# Patient Record
Sex: FEMALE | Race: WHITE | NOT HISPANIC OR LATINO | Employment: FULL TIME | URBAN - METROPOLITAN AREA
[De-identification: names, ages, dates, MRNs, and addresses within clinical notes are randomized per-mention and may not be internally consistent; named-entity substitution may affect disease eponyms.]

---

## 2018-09-07 ENCOUNTER — OFFICE VISIT (OUTPATIENT)
Dept: FAMILY MEDICINE CLINIC | Facility: CLINIC | Age: 38
End: 2018-09-07

## 2018-09-07 VITALS
HEART RATE: 92 BPM | DIASTOLIC BLOOD PRESSURE: 82 MMHG | BODY MASS INDEX: 42.04 KG/M2 | WEIGHT: 261.6 LBS | OXYGEN SATURATION: 98 % | SYSTOLIC BLOOD PRESSURE: 120 MMHG | HEIGHT: 66 IN

## 2018-09-07 DIAGNOSIS — Z30.017 ENCOUNTER FOR INITIAL PRESCRIPTION OF IMPLANTABLE SUBDERMAL CONTRACEPTIVE: ICD-10-CM

## 2018-09-07 DIAGNOSIS — G43.109 MIGRAINE WITH AURA AND WITHOUT STATUS MIGRAINOSUS, NOT INTRACTABLE: ICD-10-CM

## 2018-09-07 DIAGNOSIS — IMO0001 CLASS 3 OBESITY DUE TO EXCESS CALORIES WITH SERIOUS COMORBIDITY AND BODY MASS INDEX (BMI) OF 40.0 TO 44.9 IN ADULT: Primary | ICD-10-CM

## 2018-09-07 PROCEDURE — 99204 OFFICE O/P NEW MOD 45 MIN: CPT | Performed by: FAMILY MEDICINE

## 2018-09-07 RX ORDER — BUTALBITAL, ACETAMINOPHEN AND CAFFEINE 300; 40; 50 MG/1; MG/1; MG/1
CAPSULE ORAL
COMMUNITY
Start: 2018-07-13

## 2018-09-07 RX ORDER — CYCLOBENZAPRINE HCL 10 MG
TABLET ORAL
COMMUNITY
Start: 2018-08-24 | End: 2022-01-31

## 2018-09-07 RX ORDER — MELOXICAM 15 MG/1
1 TABLET ORAL NIGHTLY
COMMUNITY
Start: 2018-08-24 | End: 2021-02-08

## 2018-09-07 RX ORDER — ELETRIPTAN HYDROBROMIDE 40 MG/1
TABLET, FILM COATED ORAL
COMMUNITY
Start: 2018-07-31

## 2018-09-07 RX ORDER — PROPRANOLOL HYDROCHLORIDE 20 MG/1
1 TABLET ORAL DAILY
COMMUNITY
Start: 2018-07-10 | End: 2022-01-31

## 2018-09-07 RX ORDER — OLOPATADINE HYDROCHLORIDE 1 MG/ML
SOLUTION/ DROPS OPHTHALMIC
COMMUNITY
Start: 2018-06-07

## 2018-09-07 RX ORDER — DULOXETIN HYDROCHLORIDE 60 MG/1
1 CAPSULE, DELAYED RELEASE ORAL DAILY
COMMUNITY
Start: 2018-08-19 | End: 2021-02-08

## 2018-09-07 NOTE — PROGRESS NOTES
Melody Aubree Sher presents today to establish care.    Chief Complaint   Patient presents with   • Establish Care        HPI   Obesity:  Highest weight when pregnant 278. Trying to watch portion size. Not able to exercise due to pins in both hips and degenerative disc disease in back. Tried a sample of Saxenda, on and off skipping a day. Lost 5 lbs so far. Previous medications at weight loss center tried water pills, metformin, and phentermine. Headaches got worse.        Migraines:  Followed by neurologist. Propranolol is helping. Cymbalta. Phenergan, relpax, Fioricet PRN. Menstrual and ocular migraines. Worse around periods. Symptoms include nausea, eye throbbing, light sensitivity. Eye strain from working on computer. Sometimes sees little lights and flashes.     Tried a couple different birth control pills. Not planning any future pregnancies. Sexually active, 1 male partner.         Review of Systems   Constitutional: Negative.    HENT: Negative.    Eyes: Negative.    Respiratory: Negative.    Cardiovascular: Negative.    Gastrointestinal: Negative.    Endocrine: Negative.    Genitourinary: Negative.    Musculoskeletal: Positive for arthralgias and back pain.   Skin: Negative.    Allergic/Immunologic: Negative.    Neurological: Positive for headache.   Hematological: Negative.    Psychiatric/Behavioral: The patient is nervous/anxious.         Past Medical History:   Diagnosis Date   • Arthritis    • Lumbar degenerative disc disease    • Migraine with aura    • Seasonal allergies         Past Surgical History:   Procedure Laterality Date   •  SECTION     • EAR TUBES     • EYE ENUCLEATION Left    • HIP SURGERY  ,     pin in both hips         Family History   Problem Relation Age of Onset   • Arthritis Mother    • Diabetes Mother    • Migraines Mother    • Arthritis Maternal Grandmother    • Diabetes Maternal Grandmother    • Hyperlipidemia Maternal Grandmother    • Mental illness  "Maternal Grandfather         Social History     Social History   • Marital status:      Spouse name: N/A   • Number of children: N/A   • Years of education: N/A     Occupational History   • Not on file.     Social History Main Topics   • Smoking status: Former Smoker   • Smokeless tobacco: Never Used   • Alcohol use Yes      Comment: occasionally    • Drug use: No   • Sexual activity: Defer     Other Topics Concern   • Not on file     Social History Narrative   • No narrative on file        Current Outpatient Prescriptions   Medication Sig Dispense Refill   • butalbital-acetaminophen-caffeine (ORBIVAN) -40 MG capsule capsule Take 1 capsule by mouth up to twice daily for migraines     • cyclobenzaprine (FLEXERIL) 10 MG tablet Take 1 tablet by mouth 3 times daily prn.     • DULoxetine (CYMBALTA) 60 MG capsule Take 1 capsule by mouth Daily.     • eletriptan (RELPAX) 40 MG tablet Take 1 tablet by mouth at onset of headache. May repeat in 2 hours if needed     • meloxicam (MOBIC) 15 MG tablet Take 1 tablet by mouth Every Night.     • olopatadine (PATANOL) 0.1 % ophthalmic solution 1 drop in both eyes 2 times daily     • propranolol (INDERAL) 20 MG tablet Take 1 tablet by mouth Daily.     • SPRINTEC 28 0.25-35 MG-MCG per tablet Take 1 tablet by mouth Daily.     • Liraglutide -Weight Management (SAXENDA) 18 MG/3ML solution pen-injector Inject 0.6 mg under the skin into the appropriate area as directed Daily. Increase by 0.6 mg each week to a max dose of 3 mg daily 5 pen 5     No current facility-administered medications for this visit.        Allergies   Allergen Reactions   • Phentermine Other (See Comments)     headaches        Visit Vitals  /82 (BP Location: Left arm, Patient Position: Sitting, Cuff Size: Adult)   Pulse 92   Ht 166.4 cm (65.5\")   Wt 119 kg (261 lb 9.6 oz)   SpO2 98%   BMI 42.87 kg/m²        Physical Exam   Constitutional: She is oriented to person, place, and time. No distress. " "  obese   HENT:   Nose: Nose normal.   Mouth/Throat: Oropharynx is clear and moist.   Eyes: Conjunctivae are normal.   False eye left   Neck: Neck supple. No thyromegaly present.   Cardiovascular: Normal rate, regular rhythm and intact distal pulses.    No murmur heard.  Pulses:       Posterior tibial pulses are 2+ on the right side, and 2+ on the left side.   Pulmonary/Chest: Effort normal and breath sounds normal.   Abdominal: Soft. There is no hepatosplenomegaly. There is no tenderness.   Musculoskeletal: She exhibits no edema.   Lymphadenopathy:     She has no cervical adenopathy.   Neurological: She is alert and oriented to person, place, and time.   Skin: Skin is warm and dry. No rash noted.   Psychiatric: She has a normal mood and affect. Her behavior is normal. Judgment and thought content normal.   Vitals reviewed.       No results found for this or any previous visit.     Melody was seen today for establish care.  Prior PCP records requested.  Prior GYN records requested.  Diagnoses and all orders for this visit:    Class 3 obesity due to excess calories with serious comorbidity and body mass index (BMI) of 40.0 to 44.9 in adult (CMS/[facility])  -     Liraglutide -Weight Management (SAXENDA) 18 MG/3ML solution pen-injector; Inject 0.6 mg under the skin into the appropriate area as directed Daily. Increase by 0.6 mg each week to a max dose of 3 mg daily  Previous medicines tried and failed diuretics, metformin, anterior mean.  She was unable to tolerate phentermine as it made her migraine headaches worse.  She has had some success with samples of 6\".  Prescriptions sent to pharmacy and will PA if needed.  Comorbidities include degenerative disc disease.   Patient's Body mass index is 42.87 kg/m². BMI is above normal parameters. Recommendations include: nutrition counseling and pharmacological intervention.  Encounter for initial prescription of implantable subdermal contraceptive  Discussed with patient having " migraines with aura is a contraindication for combined hormonal contraception such as the Sprintec oral contraceptive pill which she has been taking.  Discussed alternatives including Mirena IUD, Nexplanon, progesterone only pills.  Patient is interested in the Nexplanon device.  Handout provided.  Will contact patient when able to receive device in clinic to schedule appointment for insertion.  At this time she will continue her coral contraceptive pill for birth control.  Migraine with aura and without status migrainosus, not intractable  Patient is under the care of a neurologist.  As discussed above recommend changing oral contraceptive pill due to contraindicated in patients who have migraine with aura.      Return in about 4 weeks (around 10/5/2018) for Follow-up obesity.

## 2018-09-07 NOTE — PATIENT INSTRUCTIONS
Inject 0.6 mg under the skin into the appropriate area as directed Daily. Increase by 0.6 mg each week to a max dose of 3 mg daily    Liraglutide injection (Weight Management)  What is this medicine?  LIRAGLUTIDE (LIR a GLOO tide) is used with a reduced calorie diet and exercise to help you lose weight.  This medicine may be used for other purposes; ask your health care provider or pharmacist if you have questions.  COMMON BRAND NAME(S): Philippe  What should I tell my health care provider before I take this medicine?  They need to know if you have any of these conditions:  -endocrine tumors (MEN 2) or if someone in your family had these tumors  -gallbladder disease  -high cholesterol  -history of alcohol abuse problem  -history of pancreatitis  -kidney disease or if you are on dialysis  -liver disease  -previous swelling of the tongue, face, or lips with difficulty breathing, difficulty swallowing, hoarseness, or tightening of the throat  -stomach problems  -suicidal thoughts, plans, or attempt; a previous suicide attempt by you or a family member  -thyroid cancer or if someone in your family had thyroid cancer  -an unusual or allergic reaction to liraglutide, other medicines, foods, dyes, or preservatives  -pregnant or trying to get pregnant  -breast-feeding  How should I use this medicine?  This medicine is for injection under the skin of your upper leg, stomach area, or upper arm. You will be taught how to prepare and give this medicine. Use exactly as directed. Take your medicine at regular intervals. Do not take it more often than directed.  It is important that you put your used needles and syringes in a special sharps container. Do not put them in a trash can. If you do not have a sharps container, call your pharmacist or healthcare provider to get one.  A special MedGuide will be given to you by the pharmacist with each prescription and refill. Be sure to read this information carefully each time.  Talk to  your pediatrician regarding the use of this medicine in children. Special care may be needed.  Overdosage: If you think you have taken too much of this medicine contact a poison control center or emergency room at once.  NOTE: This medicine is only for you. Do not share this medicine with others.  What if I miss a dose?  If you miss a dose, take it as soon as you can. If it is almost time for your next dose, take only that dose. Do not take double or extra doses. If you miss your dose for 3 days or more, call your doctor or health care professional to talk about how to restart this medicine.  What may interact with this medicine?  -insulin and other medicines for diabetes  This list may not describe all possible interactions. Give your health care provider a list of all the medicines, herbs, non-prescription drugs, or dietary supplements you use. Also tell them if you smoke, drink alcohol, or use illegal drugs. Some items may interact with your medicine.  What should I watch for while using this medicine?  Visit your doctor or health care professional for regular checks on your progress. This medicine is intended to be used in addition to a healthy diet and appropriate exercise. The best results are achieved this way. Do not increase or in any way change your dose without consulting your doctor or health care professional.  Drink plenty of fluids while taking this medicine. Check with your doctor or health care professional if you get an attack of severe diarrhea, nausea, and vomiting. The loss of too much body fluid can make it dangerous for you to take this medicine.  This medicine may affect blood sugar levels. If you have diabetes, check with your doctor or health care professional before you change your diet or the dose of your diabetic medicine.  Patients and their families should watch out for worsening depression or thoughts of suicide. Also watch out for sudden changes in feelings such as feeling anxious,  agitated, panicky, irritable, hostile, aggressive, impulsive, severely restless, overly excited and hyperactive, or not being able to sleep. If this happens, especially at the beginning of treatment or after a change in dose, call your health care professional.  What side effects may I notice from receiving this medicine?  Side effects that you should report to your doctor or health care professional as soon as possible:  -allergic reactions like skin rash, itching or hives, swelling of the face, lips, or tongue  -breathing problems  -diarrhea that continues or is severe  -lump or swelling on the neck  -severe nausea  -signs and symptoms of infection like fever or chills; cough; sore throat; pain or trouble passing urine  -signs and symptoms of low blood sugar such as feeling anxious, confusion, dizziness, increased hunger, unusually weak or tired, sweating, shakiness, cold, irritable, headache, blurred vision, fast heartbeat, loss of consciousness  -signs and symptoms of kidney injury like trouble passing urine or change in the amount of urine  -trouble swallowing  -unusual stomach upset or pain  -vomiting  Side effects that usually do not require medical attention (report to your doctor or health care professional if they continue or are bothersome):  -constipation  -decreased appetite  -diarrhea  -fatigue  -headache  -nausea  -pain, redness, or irritation at site where injected  -stomach upset  -stuffy or runny nose  This list may not describe all possible side effects. Call your doctor for medical advice about side effects. You may report side effects to FDA at 6-945-FDA-1559.  Where should I keep my medicine?  Keep out of the reach of children.  Store unopened pen in a refrigerator between 2 and 8 degrees C (36 and 46 degrees F). Do not freeze or use if the medicine has been frozen. Protect from light and excessive heat. After you first use the pen, it can be stored at room temperature between 15 and 30 degrees  C (59 and 86 degrees F) or in a refrigerator. Throw away your used pen after 30 days or after the expiration date, whichever comes first.  Do not store your pen with the needle attached. If the needle is left on, medicine may leak from the pen.  NOTE: This sheet is a summary. It may not cover all possible information. If you have questions about this medicine, talk to your doctor, pharmacist, or health care provider.  © 2018 [employer] (2018-01-04 14:41:37)

## 2018-09-13 ENCOUNTER — TELEPHONE (OUTPATIENT)
Dept: FAMILY MEDICINE CLINIC | Facility: CLINIC | Age: 38
End: 2018-09-13

## 2018-09-13 NOTE — TELEPHONE ENCOUNTER
GEGE FROM [employer] CALLED AND WOULD LIKE TO SPEAK TO EITHER PCP OR MA ABOUT A ORDER THEY RECEIVED FOR PT.    PLEASE CALL GEGE BACK AT   [phone] EXT [phone]

## 2018-10-05 ENCOUNTER — PROCEDURE VISIT (OUTPATIENT)
Dept: FAMILY MEDICINE CLINIC | Facility: CLINIC | Age: 38
End: 2018-10-05

## 2018-10-05 VITALS
SYSTOLIC BLOOD PRESSURE: 132 MMHG | BODY MASS INDEX: 41.62 KG/M2 | HEIGHT: 66 IN | WEIGHT: 259 LBS | HEART RATE: 103 BPM | OXYGEN SATURATION: 97 % | DIASTOLIC BLOOD PRESSURE: 78 MMHG

## 2018-10-05 DIAGNOSIS — Z30.017 NEXPLANON INSERTION: Primary | ICD-10-CM

## 2018-10-05 LAB
B-HCG UR QL: NEGATIVE
INTERNAL NEGATIVE CONTROL: NEGATIVE
INTERNAL POSITIVE CONTROL: POSITIVE
Lab: NORMAL

## 2018-10-05 PROCEDURE — 11981 INSERTION DRUG DLVR IMPLANT: CPT | Performed by: FAMILY MEDICINE

## 2018-10-05 PROCEDURE — 81025 URINE PREGNANCY TEST: CPT | Performed by: FAMILY MEDICINE

## 2018-10-05 NOTE — PROGRESS NOTES
Contraception:  Melody Sher presents today for contraception    Chief Complaint   Patient presents with   • Contraception     Naxplanon insertion        HPI     Current method:  [x]   Combined hormonal pill  []  Combined vaginal ring  []   Combined transdermal patch []   Progestin-only pill  []   Depo-Provera injectable  []   Nexplanon implant  []   Cu-IUD    []   LNG-IUD    LMP 9/4/18. Last active pill tomorrow before starting placebos.         Review of Systems   Genitourinary: Negative for menstrual problem.        Menstrual History         OB History     No data available           Past Medical History:   Diagnosis Date   • Anxiety    • Arthritis    • Depression    • Hyperlipidemia    • Lumbar degenerative disc disease    • Migraine with aura    • Seasonal allergies         Family History   Problem Relation Age of Onset   • Arthritis Mother    • Diabetes Mother    • Migraines Mother    • Arthritis Maternal Grandmother    • Diabetes Maternal Grandmother    • Hyperlipidemia Maternal Grandmother    • Mental illness Maternal Grandfather         Social History     Social History   • Marital status:      Spouse name: N/A   • Number of children: N/A   • Years of education: N/A     Occupational History   • Not on file.     Social History Main Topics   • Smoking status: Former Smoker   • Smokeless tobacco: Never Used   • Alcohol use Yes      Comment: occasionally    • Drug use: No   • Sexual activity: Defer     Other Topics Concern   • Not on file     Social History Narrative   • No narrative on file       Current Outpatient Prescriptions   Medication Sig Dispense Refill   • butalbital-acetaminophen-caffeine (ORBIVAN) -40 MG capsule capsule Take 1 capsule by mouth up to twice daily for migraines     • cyclobenzaprine (FLEXERIL) 10 MG tablet Take 1 tablet by mouth 3 times daily prn.     • DULoxetine (CYMBALTA) 60 MG capsule Take 1 capsule by mouth Daily.     • eletriptan (RELPAX) 40 MG tablet Take 1  tablet by mouth at onset of headache. May repeat in 2 hours if needed     • Liraglutide -Weight Management (SAXENDA) 18 MG/3ML solution pen-injector Inject 0.6 mg under the skin into the appropriate area as directed Daily. Increase by 0.6 mg each week to a max dose of 3 mg daily 5 pen 5   • meloxicam (MOBIC) 15 MG tablet Take 1 tablet by mouth Every Night.     • olopatadine (PATANOL) 0.1 % ophthalmic solution 1 drop in both eyes 2 times daily     • propranolol (INDERAL) 20 MG tablet Take 1 tablet by mouth Daily.     • SPRINTEC 28 0.25-35 MG-MCG per tablet Take 1 tablet by mouth Daily.       No current facility-administered medications for this visit.         Allergies   Allergen Reactions   • Phentermine Other (See Comments)     headaches         Physical Exam   Constitutional: She appears well-developed. No distress.   Skin:   Normal inspection and palpation left upper arm   Psychiatric: She has a normal mood and affect. Her behavior is normal. Judgment and thought content normal.      Informed consent obtained.     Site marked 8-10 cm from the medial epicondyle of the humerus of patient's preferred arm. Area prepped and draped with sterile technique. Lidocaine 1% with epinephrine 5ml injected to area. After testing for adequate anesthesia, Nexplanon device inserted in usual fashion. Nexplanon palpated subdermally. Steri-strips and pressure dressing applied. Patient tolerated procedure well.   Results for orders placed or performed in visit on 10/05/18   POCT pregnancy, urine   Result Value Ref Range    HCG, Urine, QL Negative Negative    Lot Number [id number]     Internal Positive Control Positive     Internal Negative Control Negative          Melody was seen today for contraception.    Diagnoses and all orders for this visit:    Nexplanon insertion  -     POCT pregnancy, urine  -     Etonogestrel (NEXPLANON) 68 MG subdermal implant; Inject  into the appropriate area of the skin as directed by provider 1 (One)  Time.     Successful insertion of Nexplanon left arm. She needs to take final active pill of OCPs tomorrow.     Return in about 4 weeks (around 11/2/2018) for Follow-up.

## 2019-01-14 ENCOUNTER — TELEPHONE (OUTPATIENT)
Dept: FAMILY MEDICINE CLINIC | Facility: CLINIC | Age: 39
End: 2019-01-14

## 2019-01-14 NOTE — TELEPHONE ENCOUNTER
Since early November, she has had moderate / heavy menses for 10-14 days at a time.     She has an appointment with you on 2/8/19.     Is there anything she can do until then?

## 2019-01-14 NOTE — TELEPHONE ENCOUNTER
The change in menstrual cycle is likely due to the Nexplanon implant.  If she is able to come in sooner we can discuss treatment options at that time.

## 2019-02-08 ENCOUNTER — OFFICE VISIT (OUTPATIENT)
Dept: FAMILY MEDICINE CLINIC | Facility: CLINIC | Age: 39
End: 2019-02-08

## 2019-02-08 VITALS
WEIGHT: 266 LBS | HEART RATE: 77 BPM | SYSTOLIC BLOOD PRESSURE: 122 MMHG | DIASTOLIC BLOOD PRESSURE: 78 MMHG | BODY MASS INDEX: 42.75 KG/M2 | HEIGHT: 66 IN | OXYGEN SATURATION: 98 %

## 2019-02-08 DIAGNOSIS — Z30.46 ENCOUNTER FOR SURVEILLANCE OF IMPLANTABLE SUBDERMAL CONTRACEPTIVE: Primary | ICD-10-CM

## 2019-02-08 PROCEDURE — 99213 OFFICE O/P EST LOW 20 MIN: CPT | Performed by: FAMILY MEDICINE

## 2019-02-08 RX ORDER — DICLOFENAC POTASSIUM 50 MG/1
50 TABLET, FILM COATED ORAL 2 TIMES DAILY
COMMUNITY
End: 2022-01-31

## 2019-02-08 NOTE — PROGRESS NOTES
Chief Complaint   Patient presents with   • Contraception     discuss implant        HPI   Contraception:  Nexplanon inserted in clinic October 5, 2018. Current menses for 2 weeks, spotting controlled with liners.  She is interested in using Nexplanon temporarily while her partner is planning to get a vasectomy    Obesity:  Saxenda was denied. She has been on topamax but lost eye sight. Phentermine caused headaches.  Having a hard time loosing weight. Decreasing portion sizes and weight watchers points. Walking for physical activity on treadmill.     Review of Systems   Constitutional: Positive for unexpected weight gain.   Genitourinary: Positive for menstrual problem.   Musculoskeletal: Positive for arthralgias and back pain.        Current Outpatient Medications on File Prior to Visit   Medication Sig Dispense Refill   • butalbital-acetaminophen-caffeine (ORBIVAN) -40 MG capsule capsule Take 1 capsule by mouth up to twice daily for migraines     • cyclobenzaprine (FLEXERIL) 10 MG tablet Take 1 tablet by mouth 3 times daily prn.     • diclofenac (CATAFLAM) 50 MG tablet Take 50 mg by mouth 2 (Two) Times a Day.     • DULoxetine (CYMBALTA) 60 MG capsule Take 1 capsule by mouth Daily.     • eletriptan (RELPAX) 40 MG tablet Take 1 tablet by mouth at onset of headache. May repeat in 2 hours if needed     • Etonogestrel (NEXPLANON) 68 MG implant subdermal implant Inject 1 each into the appropriate area of the skin as directed by provider 1 (One) Time.     • meloxicam (MOBIC) 15 MG tablet Take 1 tablet by mouth Every Night.     • olopatadine (PATANOL) 0.1 % ophthalmic solution 1 drop in both eyes 2 times daily     • propranolol (INDERAL) 20 MG tablet Take 1 tablet by mouth Daily.     • [DISCONTINUED] Liraglutide -Weight Management (SAXENDA) 18 MG/3ML solution pen-injector Inject 0.6 mg under the skin into the appropriate area as directed Daily. Increase by 0.6 mg each week to a max dose of 3 mg daily 5 pen 5     No  "current facility-administered medications on file prior to visit.        Allergies   Allergen Reactions   • Phentermine Other (See Comments)     headaches   • Topamax [Topiramate] Other (See Comments)     Vision change       Past Medical History:   Diagnosis Date   • Anxiety    • Arthritis    • Depression    • Hyperlipidemia    • Lumbar degenerative disc disease    • Migraine with aura    • Seasonal allergies         Past Surgical History:   Procedure Laterality Date   •  SECTION  2013   • EAR TUBES     • EYE ENUCLEATION Left    • HIP SURGERY  ,     pin in both hips         Family History   Problem Relation Age of Onset   • Arthritis Mother    • Diabetes Mother    • Migraines Mother    • Arthritis Maternal Grandmother    • Diabetes Maternal Grandmother    • Hyperlipidemia Maternal Grandmother    • Mental illness Maternal Grandfather         Social History     Socioeconomic History   • Marital status:      Spouse name: Not on file   • Number of children: Not on file   • Years of education: Not on file   • Highest education level: Not on file   Social Needs   • Financial resource strain: Not on file   • Food insecurity - worry: Not on file   • Food insecurity - inability: Not on file   • Transportation needs - medical: Not on file   • Transportation needs - non-medical: Not on file   Occupational History   • Not on file   Tobacco Use   • Smoking status: Former Smoker   • Smokeless tobacco: Never Used   Substance and Sexual Activity   • Alcohol use: Yes     Comment: occasionally    • Drug use: No   • Sexual activity: Defer   Other Topics Concern   • Not on file   Social History Narrative   • Not on file        Visit Vitals  /78 (BP Location: Left arm, Patient Position: Sitting, Cuff Size: Adult)   Pulse 77   Ht 166.4 cm (65.5\")   Wt 121 kg (266 lb)   SpO2 98%   BMI 43.59 kg/m²        Physical Exam   Constitutional: No distress. She is obese.  Cardiovascular: Normal rate and " regular rhythm.   No murmur heard.  Pulmonary/Chest: Effort normal and breath sounds normal.   Skin:   Nexplanon implant palpable left upper extremity   Psychiatric: She has a normal mood and affect.   Vitals reviewed.           Melody was seen today for contraception.    Diagnoses and all orders for this visit:    Encounter for surveillance of implantable subdermal contraceptive  Discussed with patient abnormal bleeding patterns are typical with Nexplanon implant. At this time she is not experiencing any heavy menses.  She is hoping her partner will get a vasectomy soon and then will have plans to remove Nexplanon implant  BMI 40.0-44.9, adult (CMS/[facility])  -     Liraglutide -Weight Management (SAXENDA) 18 MG/3ML solution pen-injector; Inject 0.6 mg under the skin into the appropriate area as directed Daily. Increase by 0.6 mg each week to a max dose of 3 mg daily  Worse. Start PA for Saxenda.  Previous weight loss medications included Topamax and phentermine both of which she had side effects to.  She is currently involved in a diet and exercise plan.  Start ezio over the counter while awaiting prior approval for Saxenda.  Comorbidities include back and joint pain.  Patient's Body mass index is 43.59 kg/m². BMI is above normal parameters. Recommendations include: exercise counseling, nutrition counseling and pharmacological intervention.  Once she has started Saxenda plan to schedule follow-up in one month.        Return if symptoms worsen or fail to improve.

## 2019-07-23 ENCOUNTER — LAB (OUTPATIENT)
Dept: LAB | Facility: HOSPITAL | Age: 39
End: 2019-07-23

## 2019-07-23 ENCOUNTER — OFFICE VISIT (OUTPATIENT)
Dept: FAMILY MEDICINE CLINIC | Facility: CLINIC | Age: 39
End: 2019-07-23

## 2019-07-23 VITALS
WEIGHT: 260 LBS | OXYGEN SATURATION: 97 % | DIASTOLIC BLOOD PRESSURE: 84 MMHG | HEIGHT: 66 IN | SYSTOLIC BLOOD PRESSURE: 122 MMHG | BODY MASS INDEX: 41.78 KG/M2 | HEART RATE: 124 BPM

## 2019-07-23 DIAGNOSIS — Z30.8 ENCOUNTER FOR OTHER CONTRACEPTIVE MANAGEMENT: ICD-10-CM

## 2019-07-23 DIAGNOSIS — N92.6 IRREGULAR MENSES: ICD-10-CM

## 2019-07-23 DIAGNOSIS — R53.82 CHRONIC FATIGUE: ICD-10-CM

## 2019-07-23 DIAGNOSIS — R53.82 CHRONIC FATIGUE: Primary | ICD-10-CM

## 2019-07-23 PROCEDURE — 84443 ASSAY THYROID STIM HORMONE: CPT | Performed by: FAMILY MEDICINE

## 2019-07-23 PROCEDURE — 85027 COMPLETE CBC AUTOMATED: CPT | Performed by: FAMILY MEDICINE

## 2019-07-23 PROCEDURE — 84466 ASSAY OF TRANSFERRIN: CPT | Performed by: FAMILY MEDICINE

## 2019-07-23 PROCEDURE — 83540 ASSAY OF IRON: CPT | Performed by: FAMILY MEDICINE

## 2019-07-23 PROCEDURE — 82607 VITAMIN B-12: CPT | Performed by: FAMILY MEDICINE

## 2019-07-23 PROCEDURE — 99213 OFFICE O/P EST LOW 20 MIN: CPT | Performed by: FAMILY MEDICINE

## 2019-07-23 RX ORDER — MEDROXYPROGESTERONE ACETATE 150 MG/ML
150 INJECTION, SUSPENSION INTRAMUSCULAR
Qty: 1 EACH | Refills: 2 | Status: SHIPPED | OUTPATIENT
Start: 2019-07-23 | End: 2021-02-08

## 2019-07-23 NOTE — PROGRESS NOTES
Chief Complaint   Patient presents with   • Contraception     pt would like to discuss removal of birth control implant, has been bleeding for 3 weeks now and has had a headache for 8 days. Pt also states that she has no energy.        HPI   Contraception:  Nexplanon placed last year and she is had side effects since starting the medication.  Headache for 8 days. Now on period for 3 weeks, currently spotting. No energy, feels need to sleep. Started taking b12.   Partner planning vasectomy.  She needs continue contraception until a vasectomy is complete.          Review of Systems   Constitutional: Positive for fatigue.   Genitourinary: Positive for menstrual problem.   Neurological: Positive for headache.        Current Outpatient Medications on File Prior to Visit   Medication Sig Dispense Refill   • butalbital-acetaminophen-caffeine (ORBIVAN) -40 MG capsule capsule Take 1 capsule by mouth up to twice daily for migraines     • cyclobenzaprine (FLEXERIL) 10 MG tablet Take 1 tablet by mouth 3 times daily prn.     • diclofenac (CATAFLAM) 50 MG tablet Take 50 mg by mouth 2 (Two) Times a Day.     • DULoxetine (CYMBALTA) 60 MG capsule Take 1 capsule by mouth Daily.     • eletriptan (RELPAX) 40 MG tablet Take 1 tablet by mouth at onset of headache. May repeat in 2 hours if needed     • Etonogestrel (NEXPLANON) 68 MG implant subdermal implant Inject 1 each into the appropriate area of the skin as directed by provider 1 (One) Time.     • meloxicam (MOBIC) 15 MG tablet Take 1 tablet by mouth Every Night.     • olopatadine (PATANOL) 0.1 % ophthalmic solution 1 drop in both eyes 2 times daily     • propranolol (INDERAL) 20 MG tablet Take 1 tablet by mouth Daily.     • Liraglutide -Weight Management (SAXENDA) 18 MG/3ML solution pen-injector Inject 0.6 mg under the skin into the appropriate area as directed Daily. Increase by 0.6 mg each week to a max dose of 3 mg daily 5 pen 5     No current facility-administered  "medications on file prior to visit.        Allergies   Allergen Reactions   • Phentermine Other (See Comments)     headaches   • Topamax [Topiramate] Other (See Comments)     Vision change       Past Medical History:   Diagnosis Date   • Anxiety    • Arthritis    • Depression    • Hyperlipidemia    • Lumbar degenerative disc disease    • Migraine with aura    • Seasonal allergies         Past Surgical History:   Procedure Laterality Date   •  SECTION  2013   • EAR TUBES     • EYE ENUCLEATION Left    • HIP SURGERY  ,     pin in both hips         Family History   Problem Relation Age of Onset   • Arthritis Mother    • Diabetes Mother    • Migraines Mother    • Arthritis Maternal Grandmother    • Diabetes Maternal Grandmother    • Hyperlipidemia Maternal Grandmother    • Mental illness Maternal Grandfather         Social History     Socioeconomic History   • Marital status:      Spouse name: Not on file   • Number of children: Not on file   • Years of education: Not on file   • Highest education level: Not on file   Tobacco Use   • Smoking status: Former Smoker   • Smokeless tobacco: Never Used   Substance and Sexual Activity   • Alcohol use: Yes     Comment: occasionally    • Drug use: No   • Sexual activity: Defer        Visit Vitals  /84 (BP Location: Left arm, Patient Position: Sitting, Cuff Size: Large Adult)   Pulse (!) 124   Ht 166.4 cm (65.5\")   Wt 118 kg (260 lb)   SpO2 97%   BMI 42.61 kg/m²        Physical Exam   Constitutional: No distress. She is obese.  Cardiovascular: Normal rate and regular rhythm.   No murmur heard.  Pulmonary/Chest: Effort normal and breath sounds normal.   Skin:     Nonpalpable left upper extremity   Psychiatric: She has a normal mood and affect.   Vitals reviewed.           Melody was seen today for contraception.    Diagnoses and all orders for this visit:    Chronic fatigue  -     Vitamin B12; Future  -     CBC (No Diff); Future  -     TSH " Rfx On Abnormal To Free T4; Future  -     Iron Profile; Future  New. Further evaluation with labs today.  Irregular menses  -     CBC (No Diff); Future  -     TSH Rfx On Abnormal To Free T4; Future  -     Iron Profile; Future  -     medroxyPROGESTERone (DEPO-PROVERA) 150 MG/ML injection; Inject 1 mL into the appropriate muscle as directed by prescriber Every 3 (Three) Months.  Previously discussed with patient regular menses can be a side effect of the Nexplanon.  She wishes to discontinue Nexplanon.  She will be in need of contraception.  At this time transition to Depo-Provera injection at 11 to 13-week intervals.  Recommend having Nexplanon removed at least 7 days after Depo-Provera injection to ensure contraceptive coverage.   Encounter for other contraceptive management  -     medroxyPROGESTERone (DEPO-PROVERA) 150 MG/ML injection; Inject 1 mL into the appropriate muscle as directed by prescriber Every 3 (Three) Months.   At this time transition to Depo-Provera injection at 11 to 13-week intervals.  Recommend having Nexplanon removed at least 7 days after Depo-Provera injection to ensure contraceptive coverage.       Return in about 1 week (around 7/30/2019) for Procedure Nexplanon removal.

## 2019-07-24 LAB
DEPRECATED RDW RBC AUTO: 45 FL (ref 37–54)
ERYTHROCYTE [DISTWIDTH] IN BLOOD BY AUTOMATED COUNT: 13.1 % (ref 12.3–15.4)
HCT VFR BLD AUTO: 48.5 % (ref 34–46.6)
HGB BLD-MCNC: 15.2 G/DL (ref 12–15.9)
IRON 24H UR-MRATE: 149 MCG/DL (ref 37–145)
IRON SATN MFR SERPL: 28 % (ref 20–50)
MCH RBC QN AUTO: 29.1 PG (ref 26.6–33)
MCHC RBC AUTO-ENTMCNC: 31.3 G/DL (ref 31.5–35.7)
MCV RBC AUTO: 92.7 FL (ref 79–97)
PLATELET # BLD AUTO: 331 10*3/MM3 (ref 140–450)
PMV BLD AUTO: 10.2 FL (ref 6–12)
RBC # BLD AUTO: 5.23 10*6/MM3 (ref 3.77–5.28)
TIBC SERPL-MCNC: 532 MCG/DL (ref 298–536)
TRANSFERRIN SERPL-MCNC: 357 MG/DL (ref 200–360)
TSH SERPL DL<=0.05 MIU/L-ACNC: 0.72 MIU/ML (ref 0.27–4.2)
VIT B12 BLD-MCNC: 222 PG/ML (ref 211–946)
WBC NRBC COR # BLD: 8.58 10*3/MM3 (ref 3.4–10.8)

## 2019-07-24 RX ADMIN — MEDROXYPROGESTERONE ACETATE 150 MG: 150 INJECTION, SUSPENSION INTRAMUSCULAR at 08:58

## 2019-07-25 ENCOUNTER — CLINICAL SUPPORT NO REQUIREMENTS (OUTPATIENT)
Dept: FAMILY MEDICINE CLINIC | Facility: CLINIC | Age: 39
End: 2019-07-25

## 2019-07-25 DIAGNOSIS — Z30.42 ENCOUNTER FOR DEPO-PROVERA CONTRACEPTION: Primary | ICD-10-CM

## 2019-07-25 PROCEDURE — 96372 THER/PROPH/DIAG INJ SC/IM: CPT | Performed by: FAMILY MEDICINE

## 2019-07-25 RX ORDER — MEDROXYPROGESTERONE ACETATE 150 MG/ML
150 INJECTION, SUSPENSION INTRAMUSCULAR ONCE
Status: COMPLETED | OUTPATIENT
Start: 2019-07-24 | End: 2019-07-24

## 2019-07-31 ENCOUNTER — PROCEDURE VISIT (OUTPATIENT)
Dept: FAMILY MEDICINE CLINIC | Facility: CLINIC | Age: 39
End: 2019-07-31

## 2019-07-31 VITALS
DIASTOLIC BLOOD PRESSURE: 82 MMHG | HEART RATE: 106 BPM | SYSTOLIC BLOOD PRESSURE: 128 MMHG | OXYGEN SATURATION: 98 % | BODY MASS INDEX: 42.11 KG/M2 | WEIGHT: 262 LBS | HEIGHT: 66 IN

## 2019-07-31 DIAGNOSIS — Z30.46 NEXPLANON REMOVAL: Primary | ICD-10-CM

## 2019-07-31 PROCEDURE — 11982 REMOVE DRUG IMPLANT DEVICE: CPT | Performed by: FAMILY MEDICINE

## 2019-07-31 RX ORDER — LIDOCAINE HYDROCHLORIDE 20 MG/ML
5 INJECTION, SOLUTION INFILTRATION; PERINEURAL ONCE
Status: COMPLETED | OUTPATIENT
Start: 2019-07-31 | End: 2019-07-31

## 2019-07-31 RX ADMIN — LIDOCAINE HYDROCHLORIDE 5 ML: 20 INJECTION, SOLUTION INFILTRATION; PERINEURAL at 16:00

## 2019-07-31 NOTE — PROGRESS NOTES
Chief Complaint   Patient presents with   • Contraception     Nexplanon removal        HPI   Nexplanon removal:  Switching from Nexplanon to Depo-Provera while awaiting partner's vasectomy.  Patient received Depo-Provera injection last week.  Discussed risks and benefits of procedure.  Written consent obtained.      Review of Systems   Genitourinary: Positive for menstrual problem.        Current Outpatient Medications on File Prior to Visit   Medication Sig Dispense Refill   • butalbital-acetaminophen-caffeine (ORBIVAN) -40 MG capsule capsule Take 1 capsule by mouth up to twice daily for migraines     • cyclobenzaprine (FLEXERIL) 10 MG tablet Take 1 tablet by mouth 3 times daily prn.     • diclofenac (CATAFLAM) 50 MG tablet Take 50 mg by mouth 2 (Two) Times a Day.     • DULoxetine (CYMBALTA) 60 MG capsule Take 1 capsule by mouth Daily.     • eletriptan (RELPAX) 40 MG tablet Take 1 tablet by mouth at onset of headache. May repeat in 2 hours if needed     • Liraglutide -Weight Management (SAXENDA) 18 MG/3ML solution pen-injector Inject 0.6 mg under the skin into the appropriate area as directed Daily. Increase by 0.6 mg each week to a max dose of 3 mg daily 5 pen 5   • medroxyPROGESTERone (DEPO-PROVERA) 150 MG/ML injection Inject 1 mL into the appropriate muscle as directed by prescriber Every 3 (Three) Months. 1 each 2   • meloxicam (MOBIC) 15 MG tablet Take 1 tablet by mouth Every Night.     • olopatadine (PATANOL) 0.1 % ophthalmic solution 1 drop in both eyes 2 times daily     • propranolol (INDERAL) 20 MG tablet Take 1 tablet by mouth Daily.     • [DISCONTINUED] Etonogestrel (NEXPLANON) 68 MG implant subdermal implant Inject 1 each into the appropriate area of the skin as directed by provider 1 (One) Time.       No current facility-administered medications on file prior to visit.         Allergies   Allergen Reactions   • Phentermine Other (See Comments)     headaches   • Topamax [Topiramate] Other (See  "Comments)     Vision change       Past Medical History:   Diagnosis Date   • Anxiety    • Arthritis    • Depression    • Hyperlipidemia    • Lumbar degenerative disc disease    • Migraine with aura    • Seasonal allergies        Social History     Tobacco Use   Smoking Status Former Smoker   Smokeless Tobacco Never Used        Visit Vitals  /82   Pulse 106   Ht 166.4 cm (65.51\")   Wt 119 kg (262 lb)   SpO2 98%   BMI 42.92 kg/m²        Physical Exam   Skin:   Nexplanon palpable left upper arm     Informed consent obtained.     Nexplanon palpated in arm. Skin marker to identify border of device. Site prepped and draped in with sterile technique. Lidocaine 1% with epinephrine 5 ml injected to area. After testing for adequate anesthesia, no. 15 blade scalpel used to make small incision over distal end of Nexplanon implant. Hemostat used to grasp edge of Nexplanon and removed. Nexplanon observed to be intact after removal. Steri-strips and pressure dressing applied.     Administrations This Visit     lidocaine (XYLOCAINE) 2% injection 5 mL     Admin Date  07/31/2019 Action  Given Dose  5 mL Route  Infiltration Administered By  Adeola Ramsey MD Adina was seen today for contraception.    Diagnoses and all orders for this visit:    Nexplanon removal  -     lidocaine (XYLOCAINE) 2% injection 5 mL    Successful removal.  Advised to keep Steri-Strip in place for 48 hours.      "

## 2020-04-15 DIAGNOSIS — Z30.8 ENCOUNTER FOR OTHER CONTRACEPTIVE MANAGEMENT: ICD-10-CM

## 2020-04-15 DIAGNOSIS — N92.6 IRREGULAR MENSES: ICD-10-CM

## 2020-04-15 RX ORDER — MEDROXYPROGESTERONE ACETATE 150 MG/ML
INJECTION, SUSPENSION INTRAMUSCULAR
Qty: 1 EACH | Refills: 1 | OUTPATIENT
Start: 2020-04-15

## 2020-08-05 ENCOUNTER — OFFICE VISIT (OUTPATIENT)
Dept: FAMILY MEDICINE CLINIC | Facility: CLINIC | Age: 40
End: 2020-08-05

## 2020-08-05 ENCOUNTER — TELEPHONE (OUTPATIENT)
Dept: FAMILY MEDICINE CLINIC | Facility: CLINIC | Age: 40
End: 2020-08-05

## 2020-08-05 ENCOUNTER — LAB (OUTPATIENT)
Dept: LAB | Facility: HOSPITAL | Age: 40
End: 2020-08-05

## 2020-08-05 ENCOUNTER — HOSPITAL ENCOUNTER (OUTPATIENT)
Dept: GENERAL RADIOLOGY | Facility: HOSPITAL | Age: 40
Discharge: HOME | End: 2020-08-05
Admitting: PHYSICIAN ASSISTANT

## 2020-08-05 VITALS
BODY MASS INDEX: 44.36 KG/M2 | SYSTOLIC BLOOD PRESSURE: 110 MMHG | HEIGHT: 66 IN | HEART RATE: 119 BPM | DIASTOLIC BLOOD PRESSURE: 90 MMHG | WEIGHT: 276 LBS | OXYGEN SATURATION: 99 %

## 2020-08-05 DIAGNOSIS — Z01.818 PRE-OP EXAMINATION: ICD-10-CM

## 2020-08-05 DIAGNOSIS — Z11.59 ENCOUNTER FOR HEPATITIS C SCREENING TEST FOR LOW RISK PATIENT: ICD-10-CM

## 2020-08-05 DIAGNOSIS — R82.90 ABNORMAL URINE: ICD-10-CM

## 2020-08-05 DIAGNOSIS — Z01.818 PRE-OP EXAMINATION: Primary | ICD-10-CM

## 2020-08-05 DIAGNOSIS — Z12.31 ENCOUNTER FOR SCREENING MAMMOGRAM FOR MALIGNANT NEOPLASM OF BREAST: ICD-10-CM

## 2020-08-05 DIAGNOSIS — Z13.29 SCREENING FOR THYROID DISORDER: ICD-10-CM

## 2020-08-05 DIAGNOSIS — Z13.220 SCREENING FOR CHOLESTEROL LEVEL: ICD-10-CM

## 2020-08-05 DIAGNOSIS — R00.0 TACHYCARDIA: ICD-10-CM

## 2020-08-05 LAB
ALBUMIN SERPL-MCNC: 4.5 G/DL (ref 3.5–5.2)
ALBUMIN/GLOB SERPL: 1.5 G/DL
ALP SERPL-CCNC: 68 U/L (ref 39–117)
ALT SERPL W P-5'-P-CCNC: 11 U/L (ref 1–33)
ANION GAP SERPL CALCULATED.3IONS-SCNC: 15 MMOL/L (ref 5–15)
APTT PPP: 27.1 SECONDS (ref 24–37)
AST SERPL-CCNC: 18 U/L (ref 1–32)
BASOPHILS # BLD AUTO: 0.07 10*3/MM3 (ref 0–0.2)
BASOPHILS NFR BLD AUTO: 0.7 % (ref 0–1.5)
BILIRUB BLD-MCNC: ABNORMAL MG/DL
BILIRUB SERPL-MCNC: 0.2 MG/DL (ref 0–1.2)
BUN SERPL-MCNC: 14 MG/DL (ref 6–20)
BUN/CREAT SERPL: 15.1 (ref 7–25)
CALCIUM SPEC-SCNC: 9.7 MG/DL (ref 8.6–10.5)
CHLORIDE SERPL-SCNC: 103 MMOL/L (ref 98–107)
CHOLEST SERPL-MCNC: 237 MG/DL (ref 0–200)
CLARITY, POC: CLEAR
CO2 SERPL-SCNC: 19 MMOL/L (ref 22–29)
COLOR UR: ABNORMAL
CREAT SERPL-MCNC: 0.93 MG/DL (ref 0.57–1)
DEPRECATED RDW RBC AUTO: 43.4 FL (ref 37–54)
EOSINOPHIL # BLD AUTO: 0.41 10*3/MM3 (ref 0–0.4)
EOSINOPHIL NFR BLD AUTO: 3.8 % (ref 0.3–6.2)
ERYTHROCYTE [DISTWIDTH] IN BLOOD BY AUTOMATED COUNT: 12.7 % (ref 12.3–15.4)
GFR SERPL CREATININE-BSD FRML MDRD: 66 ML/MIN/1.73
GLOBULIN UR ELPH-MCNC: 3 GM/DL
GLUCOSE SERPL-MCNC: 111 MG/DL (ref 65–99)
GLUCOSE UR STRIP-MCNC: NEGATIVE MG/DL
HBA1C MFR BLD: 5.3 % (ref 4.8–5.6)
HCT VFR BLD AUTO: 47.1 % (ref 34–46.6)
HCV AB SER DONR QL: NORMAL
HDLC SERPL-MCNC: 65 MG/DL (ref 40–60)
HGB BLD-MCNC: 15.2 G/DL (ref 12–15.9)
IMM GRANULOCYTES # BLD AUTO: 0.04 10*3/MM3 (ref 0–0.05)
IMM GRANULOCYTES NFR BLD AUTO: 0.4 % (ref 0–0.5)
INR PPP: 0.94 (ref 0.85–1.16)
KETONES UR QL: ABNORMAL
LDLC SERPL CALC-MCNC: 129 MG/DL (ref 0–100)
LDLC/HDLC SERPL: 1.98 {RATIO}
LEUKOCYTE EST, POC: ABNORMAL
LYMPHOCYTES # BLD AUTO: 3.16 10*3/MM3 (ref 0.7–3.1)
LYMPHOCYTES NFR BLD AUTO: 29.7 % (ref 19.6–45.3)
MCH RBC QN AUTO: 29.8 PG (ref 26.6–33)
MCHC RBC AUTO-ENTMCNC: 32.3 G/DL (ref 31.5–35.7)
MCV RBC AUTO: 92.4 FL (ref 79–97)
MONOCYTES # BLD AUTO: 0.79 10*3/MM3 (ref 0.1–0.9)
MONOCYTES NFR BLD AUTO: 7.4 % (ref 5–12)
NEUTROPHILS NFR BLD AUTO: 58 % (ref 42.7–76)
NEUTROPHILS NFR BLD AUTO: 6.18 10*3/MM3 (ref 1.7–7)
NITRITE UR-MCNC: POSITIVE MG/ML
NRBC BLD AUTO-RTO: 0 /100 WBC (ref 0–0.2)
PH UR: 5.5 [PH] (ref 5–8)
PLATELET # BLD AUTO: 349 10*3/MM3 (ref 140–450)
PMV BLD AUTO: 10.3 FL (ref 6–12)
POTASSIUM SERPL-SCNC: 4 MMOL/L (ref 3.5–5.2)
PROT SERPL-MCNC: 7.5 G/DL (ref 6–8.5)
PROT UR STRIP-MCNC: ABNORMAL MG/DL
PROTHROMBIN TIME: 12.2 SECONDS (ref 11.5–14)
RBC # BLD AUTO: 5.1 10*6/MM3 (ref 3.77–5.28)
RBC # UR STRIP: ABNORMAL /UL
SODIUM SERPL-SCNC: 137 MMOL/L (ref 136–145)
SP GR UR: 1.02 (ref 1–1.03)
TRIGL SERPL-MCNC: 215 MG/DL (ref 0–150)
TSH SERPL DL<=0.05 MIU/L-ACNC: 1.61 UIU/ML (ref 0.27–4.2)
UROBILINOGEN UR QL: ABNORMAL
VLDLC SERPL-MCNC: 43 MG/DL (ref 5–40)
WBC # BLD AUTO: 10.65 10*3/MM3 (ref 3.4–10.8)

## 2020-08-05 PROCEDURE — 99214 OFFICE O/P EST MOD 30 MIN: CPT | Performed by: PHYSICIAN ASSISTANT

## 2020-08-05 PROCEDURE — 80061 LIPID PANEL: CPT | Performed by: PHYSICIAN ASSISTANT

## 2020-08-05 PROCEDURE — 71046 X-RAY EXAM CHEST 2 VIEWS: CPT

## 2020-08-05 PROCEDURE — 85025 COMPLETE CBC W/AUTO DIFF WBC: CPT | Performed by: PHYSICIAN ASSISTANT

## 2020-08-05 PROCEDURE — 85610 PROTHROMBIN TIME: CPT | Performed by: PHYSICIAN ASSISTANT

## 2020-08-05 PROCEDURE — 85730 THROMBOPLASTIN TIME PARTIAL: CPT | Performed by: PHYSICIAN ASSISTANT

## 2020-08-05 PROCEDURE — 93000 ELECTROCARDIOGRAM COMPLETE: CPT | Performed by: PHYSICIAN ASSISTANT

## 2020-08-05 PROCEDURE — 36415 COLL VENOUS BLD VENIPUNCTURE: CPT | Performed by: PHYSICIAN ASSISTANT

## 2020-08-05 PROCEDURE — 81003 URINALYSIS AUTO W/O SCOPE: CPT | Performed by: PHYSICIAN ASSISTANT

## 2020-08-05 PROCEDURE — 80053 COMPREHEN METABOLIC PANEL: CPT | Performed by: PHYSICIAN ASSISTANT

## 2020-08-05 PROCEDURE — 83036 HEMOGLOBIN GLYCOSYLATED A1C: CPT | Performed by: PHYSICIAN ASSISTANT

## 2020-08-05 PROCEDURE — 84443 ASSAY THYROID STIM HORMONE: CPT | Performed by: PHYSICIAN ASSISTANT

## 2020-08-05 PROCEDURE — 86803 HEPATITIS C AB TEST: CPT | Performed by: PHYSICIAN ASSISTANT

## 2020-08-05 RX ORDER — KETOROLAC TROMETHAMINE 5 MG/ML
SOLUTION OPHTHALMIC
COMMUNITY

## 2020-08-05 RX ORDER — HYDROCODONE BITARTRATE AND ACETAMINOPHEN 5; 325 MG/1; MG/1
TABLET ORAL
COMMUNITY
End: 2022-01-31

## 2020-08-05 NOTE — PROGRESS NOTES
Chief Complaint   Patient presents with   • Pr Op Physical     Patient is having Left hip surgery on        HPI     Melody Sher is a pleasant 40 y.o. female who is here for routine preoperative exam.  Patient is planning on having total left hip replacement on  with Dr. Vallejo.  She will then have a total right hip replacement in 2021.  Patient has a long history of hip problems with chronic pain.  She is not currently on any anticoagulants.  She is on diclofenac and will discontinue this today.  She denies chest pain, cough, fever, chills, shortness of breath,  personal history of blood clot.  No urinary symptoms.  She does report that her mother had a DVT at one time but she is unaware of any type of familial bleeding disorders.  She has had anesthesia before and has tolerated it well.  Overall patient is doing well today and has no complaints.  She is aware that there is a risk of complication with any surgery.      Past Medical History:   Diagnosis Date   • Anxiety    • Arthritis    • Depression    • Hyperlipidemia    • Lumbar degenerative disc disease    • Migraine with aura    • Seasonal allergies        Past Surgical History:   Procedure Laterality Date   •  SECTION  2013   • EAR TUBES     • EYE ENUCLEATION Left    • HIP SURGERY  ,     pin in both hips        Family History   Problem Relation Age of Onset   • Arthritis Mother    • Diabetes Mother    • Migraines Mother    • Deep vein thrombosis Mother    • Arthritis Maternal Grandmother    • Diabetes Maternal Grandmother    • Hyperlipidemia Maternal Grandmother    • Mental illness Maternal Grandfather        Social History     Socioeconomic History   • Marital status:      Spouse name: Not on file   • Number of children: Not on file   • Years of education: Not on file   • Highest education level: Not on file   Tobacco Use   • Smoking status: Former Smoker   • Smokeless tobacco: Never  "Used   Substance and Sexual Activity   • Alcohol use: Yes     Comment: occasionally    • Drug use: No   • Sexual activity: Defer       Allergies   Allergen Reactions   • Phentermine Other (See Comments)     headaches   • Topamax [Topiramate] Other (See Comments)     Vision change       ROS  Review of Systems   Constitutional: Negative for chills, diaphoresis, fatigue and fever.   HENT: Negative for congestion, postnasal drip and rhinorrhea.    Eyes: Positive for visual disturbance.   Respiratory: Negative for cough, shortness of breath and wheezing.    Cardiovascular: Negative for chest pain and leg swelling.   Gastrointestinal: Negative for abdominal pain, blood in stool, constipation, diarrhea, nausea and vomiting.   Genitourinary: Negative for dysuria, flank pain and hematuria.   Musculoskeletal: Positive for arthralgias.   Skin: Negative for rash.   Neurological: Negative for dizziness and headache.   Psychiatric/Behavioral: Negative for self-injury, sleep disturbance, suicidal ideas, depressed mood and stress. The patient is not nervous/anxious.        Vitals:    08/05/20 0901   BP: 110/90   Pulse: 119   SpO2: 99%   Weight: 125 kg (276 lb)   Height: 166.4 cm (65.5\")     Body mass index is 45.23 kg/m².    Current Outpatient Medications on File Prior to Visit   Medication Sig Dispense Refill   • butalbital-acetaminophen-caffeine (ORBIVAN) -40 MG capsule capsule Take 1 capsule by mouth up to twice daily for migraines     • cyclobenzaprine (FLEXERIL) 10 MG tablet Take 1 tablet by mouth 3 times daily prn.     • diclofenac (CATAFLAM) 50 MG tablet Take 50 mg by mouth 2 (Two) Times a Day.     • DULoxetine (CYMBALTA) 60 MG capsule Take 1 capsule by mouth Daily.     • eletriptan (RELPAX) 40 MG tablet Take 1 tablet by mouth at onset of headache. May repeat in 2 hours if needed     • Liraglutide -Weight Management (SAXENDA) 18 MG/3ML solution pen-injector Inject 0.6 mg under the skin into the appropriate area as " directed Daily. Increase by 0.6 mg each week to a max dose of 3 mg daily 5 pen 5   • medroxyPROGESTERone (DEPO-PROVERA) 150 MG/ML injection Inject 1 mL into the appropriate muscle as directed by prescriber Every 3 (Three) Months. 1 each 2   • meloxicam (MOBIC) 15 MG tablet Take 1 tablet by mouth Every Night.     • olopatadine (PATANOL) 0.1 % ophthalmic solution 1 drop in both eyes 2 times daily     • propranolol (INDERAL) 20 MG tablet Take 1 tablet by mouth Daily.     • HYDROcodone-acetaminophen (NORCO) 5-325 MG per tablet hydrocodone 5 mg-acetaminophen 325 mg tablet     • ketorolac (ACULAR) 0.5 % ophthalmic solution ketorolac 0.5 % eye drops       No current facility-administered medications on file prior to visit.        Results for orders placed or performed in visit on 07/23/19   Vitamin B12   Result Value Ref Range    Vitamin B-12 222 211 - 946 pg/mL   CBC (No Diff)   Result Value Ref Range    WBC 8.58 3.40 - 10.80 10*3/mm3    RBC 5.23 3.77 - 5.28 10*6/mm3    Hemoglobin 15.2 12.0 - 15.9 g/dL    Hematocrit 48.5 (H) 34.0 - 46.6 %    MCV 92.7 79.0 - 97.0 fL    MCH 29.1 26.6 - 33.0 pg    MCHC 31.3 (L) 31.5 - 35.7 g/dL    RDW 13.1 12.3 - 15.4 %    RDW-SD 45.0 37.0 - 54.0 fl    MPV 10.2 6.0 - 12.0 fL    Platelets 331 140 - 450 10*3/mm3   TSH Rfx On Abnormal To Free T4   Result Value Ref Range    TSH 0.722 0.270 - 4.200 mIU/mL   Iron Profile   Result Value Ref Range    Iron 149 (H) 37 - 145 mcg/dL    Iron Saturation 28 20 - 50 %    Transferrin 357 200 - 360 mg/dL    TIBC 532 298 - 536 mcg/dL       PE    Physical Exam   Constitutional: Vital signs are normal. She appears well-developed and well-nourished. She is active and cooperative. She does not appear ill. No distress. She is morbidly obese.  HENT:   Head: Normocephalic and atraumatic.   Right Ear: Hearing, tympanic membrane, external ear and ear canal normal.   Left Ear: Hearing, tympanic membrane, external ear and ear canal normal.   Nose: Nose normal. Right  sinus exhibits no frontal sinus tenderness. Left sinus exhibits no frontal sinus tenderness.   Eyes: EOM are normal.   Neck: Trachea normal and normal range of motion. No thyroid mass and no thyromegaly present.   Cardiovascular: Regular rhythm. Tachycardia present. Exam reveals distant heart sounds.   Pulmonary/Chest: Effort normal and breath sounds normal.   Musculoskeletal: Normal range of motion. She exhibits no edema.   Neurological: She is alert.   Skin: Skin is warm. She is not diaphoretic. No erythema.   Psychiatric: She has a normal mood and affect. Her speech is normal and behavior is normal. Judgment and thought content normal. She is not actively hallucinating. Cognition and memory are normal. She is attentive.     ECG 12 Lead  Date/Time: 8/5/2020 9:54 AM  Performed by: Michelle Zaragoza PA-C  Authorized by: Michelle Zaragoza PA-C   Comparison: not compared with previous ECG   Previous ECG: no previous ECG available  Rhythm: sinus tachycardia    Clinical impression: non-specific ECG              A/P    Melody was seen today for pr op physical.    Diagnoses and all orders for this visit:    Pre-op examination  -     XR Chest PA & Lateral; Future  -     CBC Auto Differential; Future  -     Comprehensive Metabolic Panel; Future  -     Hemoglobin A1c; Future  -     Protime-INR; Future  -     APTT; Future  -     POC Urinalysis Dipstick, Automated  -     ECG 12 Lead  Total left hip replacement scheduled for August 19th with Dr. Hauser.  No history of MI/PE/CVA.  No symptoms of chest pain, SOB, cough, fever, chills, urinary symptoms today.    Not on any anticoagulants, will stop diclofenac today.  Mother had DVT, patient has never had blood clot.  No known family or personal history of bleeding/clotting disorders.  Has tolerated anesthesia well in the past.    We discussed the patient's very low risk, 0.4%,  for major cardiovascular complications (MI, ventricular arrhythmia, cardiac arrest,  pulmonary edema, complete heart block)  pertaining to surgery per the Revised Cardiac Risk Index (0.4%-Rene Criteria) and that  complications that can arise even in healthy individuals. There are no additional steps to take in order to reduce current surgical risk. The patient verbalizes understanding and agrees to proceed as planned.       Tachycardia  Patient states that she is nervous today at PCP office.  She states she normally has a regular rhythm and speed.    BMI 40.0-44.9, adult (CMS/[facility])    Encounter for hepatitis C screening test for low risk patient  -     Hepatitis C Antibody; Future    Screening for thyroid disorder  -     TSH Rfx On Abnormal To Free T4; Future    Screening for cholesterol level  -     Lipid Panel; Future    Encounter for screening mammogram for malignant neoplasm of breast  -     Mammo Screening Digital Tomosynthesis Bilateral With CAD; Future         Plan of care reviewed with patient at the conclusion of today's visit. Education was provided regarding diagnosis, management and any prescribed or recommended OTC medications.  Patient verbalizes understanding of and agreement with management plan.    Return in about 6 months (around 2/5/2021) for Annual physical.     Michelle Zaragoza PA-C

## 2020-08-05 NOTE — TELEPHONE ENCOUNTER
Spoke with patient per Michelle SWENSON, to explain that her UA dip came back abnormal. There was not enough for a urine culture so she is to return to our lab to drop off another sample. The patient verbalized understanding and had no further questions.

## 2020-08-06 NOTE — PROGRESS NOTES
I have reviewed the notes, assessments, and/or procedures performed by MESSI Sewell, I concur with her/his documentation of Melody Sher.

## 2020-08-07 ENCOUNTER — LAB (OUTPATIENT)
Dept: LAB | Facility: HOSPITAL | Age: 40
End: 2020-08-07

## 2020-08-07 PROCEDURE — 87086 URINE CULTURE/COLONY COUNT: CPT | Performed by: PHYSICIAN ASSISTANT

## 2020-08-08 LAB — BACTERIA SPEC AEROBE CULT: NORMAL

## 2020-08-11 ENCOUNTER — TELEPHONE (OUTPATIENT)
Dept: FAMILY MEDICINE CLINIC | Facility: CLINIC | Age: 40
End: 2020-08-11

## 2020-08-11 NOTE — TELEPHONE ENCOUNTER
Adina with [facility] called regarding the pt's pre op clearance. They need the office note,lab work,chest xray,and ekg faxed to their office.     Fax : [phone]  Phone: [phone] ext [phone]

## 2020-08-13 ENCOUNTER — TELEPHONE (OUTPATIENT)
Dept: FAMILY MEDICINE CLINIC | Facility: CLINIC | Age: 40
End: 2020-08-13

## 2020-08-13 DIAGNOSIS — Z01.818 PRE-OP TESTING: Primary | ICD-10-CM

## 2020-08-13 LAB
BILIRUB BLD-MCNC: NEGATIVE MG/DL
CLARITY, POC: CLEAR
COLOR UR: YELLOW
GLUCOSE UR STRIP-MCNC: NEGATIVE MG/DL
KETONES UR QL: NEGATIVE
LEUKOCYTE EST, POC: NEGATIVE
NITRITE UR-MCNC: NEGATIVE MG/ML
PH UR: 6 [PH] (ref 5–8)
PROT UR STRIP-MCNC: NEGATIVE MG/DL
RBC # UR STRIP: NEGATIVE /UL
SP GR UR: 1.03 (ref 1–1.03)
UROBILINOGEN UR QL: NORMAL

## 2020-08-13 PROCEDURE — 81003 URINALYSIS AUTO W/O SCOPE: CPT | Performed by: PHYSICIAN ASSISTANT

## 2020-08-13 NOTE — TELEPHONE ENCOUNTER
Dr. Hauser's office requested a repeat UA based on previous results.     Results are in chart.     Results forwarded to Dr. Hauser's office.

## 2020-12-03 ENCOUNTER — APPOINTMENT (OUTPATIENT)
Dept: MAMMOGRAPHY | Facility: HOSPITAL | Age: 40
End: 2020-12-03

## 2021-02-08 ENCOUNTER — LAB (OUTPATIENT)
Dept: LAB | Facility: HOSPITAL | Age: 41
End: 2021-02-08

## 2021-02-08 ENCOUNTER — OFFICE VISIT (OUTPATIENT)
Dept: FAMILY MEDICINE CLINIC | Facility: CLINIC | Age: 41
End: 2021-02-08

## 2021-02-08 VITALS
BODY MASS INDEX: 44.71 KG/M2 | HEIGHT: 66 IN | SYSTOLIC BLOOD PRESSURE: 112 MMHG | OXYGEN SATURATION: 98 % | HEART RATE: 92 BPM | WEIGHT: 278.2 LBS | DIASTOLIC BLOOD PRESSURE: 88 MMHG

## 2021-02-08 DIAGNOSIS — N39.0 URINARY TRACT INFECTION WITHOUT HEMATURIA, SITE UNSPECIFIED: Primary | ICD-10-CM

## 2021-02-08 DIAGNOSIS — Z01.818 PRE-OP EXAMINATION: ICD-10-CM

## 2021-02-08 DIAGNOSIS — M93.80: ICD-10-CM

## 2021-02-08 DIAGNOSIS — N39.0 URINARY TRACT INFECTION WITHOUT HEMATURIA, SITE UNSPECIFIED: ICD-10-CM

## 2021-02-08 PROBLEM — Z90.01 H/O ENUCLEATION OF LEFT EYEBALL: Status: ACTIVE | Noted: 2021-02-08

## 2021-02-08 PROBLEM — H52.10 SEVERE MYOPIA: Status: ACTIVE | Noted: 2019-01-22

## 2021-02-08 LAB
ALBUMIN SERPL-MCNC: 4.2 G/DL (ref 3.5–5.2)
ALBUMIN/GLOB SERPL: 1.4 G/DL
ALP SERPL-CCNC: 69 U/L (ref 39–117)
ALT SERPL W P-5'-P-CCNC: 9 U/L (ref 1–33)
ANION GAP SERPL CALCULATED.3IONS-SCNC: 10 MMOL/L (ref 5–15)
APTT PPP: 29 SECONDS (ref 24–37)
AST SERPL-CCNC: 11 U/L (ref 1–32)
BASOPHILS # BLD AUTO: 0.05 10*3/MM3 (ref 0–0.2)
BASOPHILS NFR BLD AUTO: 0.5 % (ref 0–1.5)
BILIRUB BLD-MCNC: NEGATIVE MG/DL
BILIRUB SERPL-MCNC: 0.3 MG/DL (ref 0–1.2)
BUN SERPL-MCNC: 9 MG/DL (ref 6–20)
BUN/CREAT SERPL: 11.3 (ref 7–25)
CALCIUM SPEC-SCNC: 9.5 MG/DL (ref 8.6–10.5)
CHLORIDE SERPL-SCNC: 103 MMOL/L (ref 98–107)
CLARITY, POC: ABNORMAL
CO2 SERPL-SCNC: 26 MMOL/L (ref 22–29)
COLOR UR: YELLOW
CREAT SERPL-MCNC: 0.8 MG/DL (ref 0.57–1)
DEPRECATED RDW RBC AUTO: 45.3 FL (ref 37–54)
EOSINOPHIL # BLD AUTO: 0.18 10*3/MM3 (ref 0–0.4)
EOSINOPHIL NFR BLD AUTO: 1.6 % (ref 0.3–6.2)
ERYTHROCYTE [DISTWIDTH] IN BLOOD BY AUTOMATED COUNT: 14 % (ref 12.3–15.4)
GFR SERPL CREATININE-BSD FRML MDRD: 79 ML/MIN/1.73
GLOBULIN UR ELPH-MCNC: 2.9 GM/DL
GLUCOSE SERPL-MCNC: 90 MG/DL (ref 65–99)
GLUCOSE UR STRIP-MCNC: NEGATIVE MG/DL
HBA1C MFR BLD: 5.43 % (ref 4.8–5.6)
HCT VFR BLD AUTO: 44.7 % (ref 34–46.6)
HGB BLD-MCNC: 14.5 G/DL (ref 12–15.9)
IMM GRANULOCYTES # BLD AUTO: 0.05 10*3/MM3 (ref 0–0.05)
IMM GRANULOCYTES NFR BLD AUTO: 0.5 % (ref 0–0.5)
INR PPP: 1.01 (ref 0.85–1.16)
KETONES UR QL: NEGATIVE
LEUKOCYTE EST, POC: ABNORMAL
LYMPHOCYTES # BLD AUTO: 2.86 10*3/MM3 (ref 0.7–3.1)
LYMPHOCYTES NFR BLD AUTO: 25.8 % (ref 19.6–45.3)
MCH RBC QN AUTO: 28.9 PG (ref 26.6–33)
MCHC RBC AUTO-ENTMCNC: 32.4 G/DL (ref 31.5–35.7)
MCV RBC AUTO: 89.2 FL (ref 79–97)
MONOCYTES # BLD AUTO: 0.78 10*3/MM3 (ref 0.1–0.9)
MONOCYTES NFR BLD AUTO: 7 % (ref 5–12)
NEUTROPHILS NFR BLD AUTO: 64.6 % (ref 42.7–76)
NEUTROPHILS NFR BLD AUTO: 7.17 10*3/MM3 (ref 1.7–7)
NITRITE UR-MCNC: POSITIVE MG/ML
NRBC BLD AUTO-RTO: 0 /100 WBC (ref 0–0.2)
PH UR: 6 [PH] (ref 5–8)
PLATELET # BLD AUTO: 297 10*3/MM3 (ref 140–450)
PMV BLD AUTO: 10.2 FL (ref 6–12)
POTASSIUM SERPL-SCNC: 4.5 MMOL/L (ref 3.5–5.2)
PROT SERPL-MCNC: 7.1 G/DL (ref 6–8.5)
PROT UR STRIP-MCNC: ABNORMAL MG/DL
PROTHROMBIN TIME: 13 SECONDS (ref 11.5–14)
RBC # BLD AUTO: 5.01 10*6/MM3 (ref 3.77–5.28)
RBC # UR STRIP: NEGATIVE /UL
SODIUM SERPL-SCNC: 139 MMOL/L (ref 136–145)
SP GR UR: 1.02 (ref 1–1.03)
UROBILINOGEN UR QL: NORMAL
WBC # BLD AUTO: 11.09 10*3/MM3 (ref 3.4–10.8)

## 2021-02-08 PROCEDURE — 84134 ASSAY OF PREALBUMIN: CPT

## 2021-02-08 PROCEDURE — 87186 SC STD MICRODIL/AGAR DIL: CPT

## 2021-02-08 PROCEDURE — 83036 HEMOGLOBIN GLYCOSYLATED A1C: CPT

## 2021-02-08 PROCEDURE — 85610 PROTHROMBIN TIME: CPT

## 2021-02-08 PROCEDURE — 87086 URINE CULTURE/COLONY COUNT: CPT

## 2021-02-08 PROCEDURE — 85025 COMPLETE CBC W/AUTO DIFF WBC: CPT

## 2021-02-08 PROCEDURE — 81003 URINALYSIS AUTO W/O SCOPE: CPT | Performed by: NURSE PRACTITIONER

## 2021-02-08 PROCEDURE — 99214 OFFICE O/P EST MOD 30 MIN: CPT | Performed by: NURSE PRACTITIONER

## 2021-02-08 PROCEDURE — 80053 COMPREHEN METABOLIC PANEL: CPT

## 2021-02-08 PROCEDURE — 93000 ELECTROCARDIOGRAM COMPLETE: CPT | Performed by: NURSE PRACTITIONER

## 2021-02-08 PROCEDURE — 85730 THROMBOPLASTIN TIME PARTIAL: CPT

## 2021-02-08 PROCEDURE — 87077 CULTURE AEROBIC IDENTIFY: CPT

## 2021-02-08 RX ORDER — PROMETHAZINE HYDROCHLORIDE 25 MG/1
TABLET ORAL
COMMUNITY
Start: 2020-11-20

## 2021-02-08 RX ORDER — FLUCONAZOLE 150 MG/1
150 TABLET ORAL ONCE
Qty: 1 TABLET | Refills: 0 | Status: SHIPPED | OUTPATIENT
Start: 2021-02-08 | End: 2021-02-08

## 2021-02-08 RX ORDER — CEPHALEXIN 500 MG/1
500 CAPSULE ORAL 2 TIMES DAILY
Qty: 10 CAPSULE | Refills: 0 | Status: SHIPPED | OUTPATIENT
Start: 2021-02-08 | End: 2021-02-13

## 2021-02-08 NOTE — PROGRESS NOTES
"Chief Complaint  Pre-op Exam (Rt hip) and Urinary Tract Infection    Subjective          Melody Sher presents to [facility] for     History of Present Illness   Patient presents today for preop exam for right total hip replacement by Dr. Hauser.  She has had surgery previously and tolerated it well.  She denies any issues with anesthesia.  She has never had issues with her b/p.  She denies personal history of heart issues.  Her mother does have a history of arrythmia and DVT.  She denies issues with bleeding.  She denies any bleeding problems with anyone in her family or any coagulation issues.  She is not taking any blood thinners.  She is not taking any NSAIDs.  She denies chest pain, shortness of breath, swelling.    She has been having pain in her left side.  She thought maybe it was her ovaries.  She hasn't started her period.  This started on Friday.  Her urine has been cloudy.  She took Azo.  She is still having the pain even though she took the Azo.  She has been drinking plenty of fluids.  She denies fever, chills, or body aches.      Objective   Vital Signs:   /88 (BP Location: Right arm, Patient Position: Sitting, Cuff Size: Large Adult)   Pulse 92   Ht 166.4 cm (65.51\")   Wt 126 kg (278 lb 3.2 oz)   SpO2 98%   BMI 45.57 kg/m²       Physical Exam  Vitals signs reviewed.   Constitutional:       Appearance: Normal appearance. She is obese.   HENT:      Head: Normocephalic and atraumatic.      Right Ear: Tympanic membrane, ear canal and external ear normal.      Left Ear: Tympanic membrane, ear canal and external ear normal.      Nose: Nose normal.      Mouth/Throat:      Mouth: Mucous membranes are moist.      Pharynx: Oropharynx is clear.   Cardiovascular:      Rate and Rhythm: Normal rate and regular rhythm.      Pulses: Normal pulses.      Heart sounds: Normal heart sounds.   Pulmonary:      Effort: Pulmonary effort is normal.      Breath sounds: " Normal breath sounds.   Abdominal:      General: Bowel sounds are normal.      Palpations: Abdomen is soft.      Tenderness: There is no abdominal tenderness. There is no guarding or rebound.   Musculoskeletal:      Comments: Walks with limp, uses cane.   Skin:     General: Skin is warm and dry.   Neurological:      General: No focal deficit present.      Mental Status: She is alert and oriented to person, place, and time.   Psychiatric:         Mood and Affect: Mood normal.         Behavior: Behavior normal.         Thought Content: Thought content normal.         Judgment: Judgment normal.        Result Review :   The following data was reviewed by: ANTHONY Salamanca on 02/08/2021:  CMP    CMP 8/5/20 2/8/21   Glucose 111 (A) 90   BUN 14 9   Creatinine 0.93 0.80   eGFR Non African Am 66 79   Sodium 137 139   Potassium 4.0 4.5   Chloride 103 103   Calcium 9.7 9.5   Albumin 4.50 4.20   Total Bilirubin 0.2 0.3   Alkaline Phosphatase 68 69   AST (SGOT) 18 11   ALT (SGPT) 11 9   (A) Abnormal value            CBC w/diff    CBC w/Diff 8/5/20 2/8/21   WBC 10.65 11.09 (A)   RBC 5.10 5.01   Hemoglobin 15.2 14.5   Hematocrit 47.1 (A) 44.7   MCV 92.4 89.2   MCH 29.8 28.9   MCHC 32.3 32.4   RDW 12.7 14.0   Platelets 349 297   Neutrophil Rel % 58.0 64.6   Immature Granulocyte Rel % 0.4 0.5   Lymphocyte Rel % 29.7 25.8   Monocyte Rel % 7.4 7.0   Eosinophil Rel % 3.8 1.6   Basophil Rel % 0.7 0.5   (A) Abnormal value                 ECG 12 Lead    Date/Time: 2/8/2021 9:05 AM  Performed by: Jaquelin Campbell APRN  Authorized by: Jaquelin Campbell APRN   Comparison: compared with previous ECG   Similar to previous ECG  Rhythm: sinus rhythm  Rate: normal  Conduction: conduction normal  ST Segments: ST segments normal  T Waves: T waves normal  QRS axis: normal  Other: no other findings    Clinical impression: normal ECG              Assessment and Plan    Problem List Items Addressed This Visit        Musculoskeletal and Injuries     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 Hauser.    Overview     Bilateral hips.  In 1990 and 1991, she had pins put in her hips.  She had her left hip replaced in August 2020 by Dr. Hauser.           Other Visit Diagnoses     Urinary tract infection without hematuria, site unspecified    -  Primary -new problem.  UA positive for infection.  Will start Keflex 500 mg twice a day for 5 days.  She can use Azo for 2 days to help with burning.  She does have issues with developing a yeast infection after antibiotics.  Will provide 1 dose of Diflucan 150 mg.  Encouraged patient to drink adequate amounts of fluid.  Wipe front to back.  If symptoms worsen or do not improve, return to clinic.  Will recheck urine in 1 week to ensure resolution of infection.    Relevant Medications    cephalexin (Keflex) 500 MG capsule    fluconazole (Diflucan) 150 MG tablet    Other Relevant Orders    POC Urinalysis Dipstick, Automated (Completed)    Urine Culture - Urine, Urine, Clean Catch    Pre-op examination    -EKG: normal EKG, normal sinus rhythm, unchanged from previous tracings.patient's labs are acceptable.  Her white blood cells are mildly elevated at 11.09 and this is likely due to her urinary tract infection.  She is being treated for this currently and her UA will be rechecked in 1 week to ensure resolution.  As long as UTI has resolved, patient is approved for surgery.    Relevant Orders    Hemoglobin A1c    CBC & Differential    Protime-INR    Comprehensive Metabolic Panel    aPTT    Prealbumin    ECG 12 Lead          Follow Up {Instructions Charge Capture  Follow-up Communications :23}  Return if symptoms worsen or fail to improve, for will call and schedule physical after surgery..  Patient was given instructions and counseling regarding her condition or for health maintenance advice. Please see specific information pulled  into the AVS if appropriate.

## 2021-02-09 LAB — PREALB SERPL-MCNC: 22.1 MG/DL (ref 20–40)

## 2021-02-10 ENCOUNTER — TELEPHONE (OUTPATIENT)
Dept: FAMILY MEDICINE CLINIC | Facility: CLINIC | Age: 41
End: 2021-02-10

## 2021-02-10 LAB — BACTERIA SPEC AEROBE CULT: ABNORMAL

## 2021-02-10 NOTE — TELEPHONE ENCOUNTER
Pt called stating that she thinks symptoms are getting better but she is still having a little bit of cramping.

## 2021-02-10 NOTE — TELEPHONE ENCOUNTER
Attempted to call patient about her urine culture results.  No answer.  Left voicemail to return call.

## 2021-08-04 ENCOUNTER — TELEPHONE (OUTPATIENT)
Dept: FAMILY MEDICINE CLINIC | Facility: CLINIC | Age: 41
End: 2021-08-04

## 2021-08-04 NOTE — TELEPHONE ENCOUNTER
Caller: Melody Sher    Relationship: Self    Best call back number: [phone]    What medication are you requesting: CREAM FOR BREAST RASH     What are your current symptoms: RASH UNDER BREASTS     How long have you been experiencing symptoms: 1 WEEK     Have you had these symptoms before:    [] Yes  [x] No    Have you been treated for these symptoms before:   [] Yes  [x] No    If a prescription is needed, what is your preferred pharmacy and phone number: [id number] [place] [place] [address] [place] [address] [address] AT [place] [address] - [phone] [facility] [phone]      Additional notes:

## 2021-08-04 NOTE — TELEPHONE ENCOUNTER
New rash needs to be evaluated in clinic. Please schedule appointment,     Okay for hub to relay the message.

## 2021-08-05 NOTE — TELEPHONE ENCOUNTER
Attempted to contact pt to schedule appt as requested by EDS. There was no answer. I left VM to call office and schedule upcoming appt.     HUB MAY SCHEDULE

## 2022-01-31 ENCOUNTER — LAB (OUTPATIENT)
Dept: LAB | Facility: HOSPITAL | Age: 42
End: 2022-01-31

## 2022-01-31 ENCOUNTER — OFFICE VISIT (OUTPATIENT)
Dept: FAMILY MEDICINE CLINIC | Facility: CLINIC | Age: 42
End: 2022-01-31

## 2022-01-31 VITALS
HEART RATE: 94 BPM | WEIGHT: 283.4 LBS | HEIGHT: 66 IN | OXYGEN SATURATION: 100 % | DIASTOLIC BLOOD PRESSURE: 72 MMHG | BODY MASS INDEX: 45.55 KG/M2 | SYSTOLIC BLOOD PRESSURE: 128 MMHG

## 2022-01-31 DIAGNOSIS — Z23 NEED FOR VACCINATION: ICD-10-CM

## 2022-01-31 DIAGNOSIS — E66.01 MORBID OBESITY: ICD-10-CM

## 2022-01-31 DIAGNOSIS — Z12.4 SCREENING FOR CERVICAL CANCER: ICD-10-CM

## 2022-01-31 DIAGNOSIS — Z00.00 WELL ADULT EXAM: ICD-10-CM

## 2022-01-31 DIAGNOSIS — Z00.00 WELL ADULT EXAM: Primary | ICD-10-CM

## 2022-01-31 DIAGNOSIS — R73.01 ELEVATED FASTING GLUCOSE: ICD-10-CM

## 2022-01-31 DIAGNOSIS — Z12.31 VISIT FOR SCREENING MAMMOGRAM: ICD-10-CM

## 2022-01-31 LAB
ALBUMIN SERPL-MCNC: 4.4 G/DL (ref 3.5–5.2)
ALBUMIN/GLOB SERPL: 1.6 G/DL
ALP SERPL-CCNC: 78 U/L (ref 39–117)
ALT SERPL W P-5'-P-CCNC: 11 U/L (ref 1–33)
ANION GAP SERPL CALCULATED.3IONS-SCNC: 12.7 MMOL/L (ref 5–15)
AST SERPL-CCNC: 16 U/L (ref 1–32)
BILIRUB SERPL-MCNC: 0.2 MG/DL (ref 0–1.2)
BUN SERPL-MCNC: 10 MG/DL (ref 6–20)
BUN/CREAT SERPL: 12.3 (ref 7–25)
CALCIUM SPEC-SCNC: 8.7 MG/DL (ref 8.6–10.5)
CHLORIDE SERPL-SCNC: 105 MMOL/L (ref 98–107)
CHOLEST SERPL-MCNC: 191 MG/DL (ref 0–200)
CO2 SERPL-SCNC: 20.3 MMOL/L (ref 22–29)
CREAT SERPL-MCNC: 0.81 MG/DL (ref 0.57–1)
DEPRECATED RDW RBC AUTO: 42.1 FL (ref 37–54)
ERYTHROCYTE [DISTWIDTH] IN BLOOD BY AUTOMATED COUNT: 13.1 % (ref 12.3–15.4)
GFR SERPL CREATININE-BSD FRML MDRD: 77 ML/MIN/1.73
GLOBULIN UR ELPH-MCNC: 2.8 GM/DL
GLUCOSE SERPL-MCNC: 101 MG/DL (ref 65–99)
HCT VFR BLD AUTO: 45 % (ref 34–46.6)
HDLC SERPL-MCNC: 70 MG/DL (ref 40–60)
HGB BLD-MCNC: 14.9 G/DL (ref 12–15.9)
LDLC SERPL CALC-MCNC: 96 MG/DL (ref 0–100)
LDLC/HDLC SERPL: 1.31 {RATIO}
MCH RBC QN AUTO: 29.4 PG (ref 26.6–33)
MCHC RBC AUTO-ENTMCNC: 33.1 G/DL (ref 31.5–35.7)
MCV RBC AUTO: 88.8 FL (ref 79–97)
PLATELET # BLD AUTO: 333 10*3/MM3 (ref 140–450)
PMV BLD AUTO: 10.3 FL (ref 6–12)
POTASSIUM SERPL-SCNC: 4.3 MMOL/L (ref 3.5–5.2)
PROT SERPL-MCNC: 7.2 G/DL (ref 6–8.5)
RBC # BLD AUTO: 5.07 10*6/MM3 (ref 3.77–5.28)
SODIUM SERPL-SCNC: 138 MMOL/L (ref 136–145)
TRIGL SERPL-MCNC: 146 MG/DL (ref 0–150)
TSH SERPL DL<=0.05 MIU/L-ACNC: 1.13 UIU/ML (ref 0.27–4.2)
VLDLC SERPL-MCNC: 25 MG/DL (ref 5–40)
WBC NRBC COR # BLD: 10.86 10*3/MM3 (ref 3.4–10.8)

## 2022-01-31 PROCEDURE — 99396 PREV VISIT EST AGE 40-64: CPT | Performed by: FAMILY MEDICINE

## 2022-01-31 PROCEDURE — 90471 IMMUNIZATION ADMIN: CPT | Performed by: FAMILY MEDICINE

## 2022-01-31 PROCEDURE — 83036 HEMOGLOBIN GLYCOSYLATED A1C: CPT

## 2022-01-31 PROCEDURE — 36415 COLL VENOUS BLD VENIPUNCTURE: CPT

## 2022-01-31 PROCEDURE — 80050 GENERAL HEALTH PANEL: CPT

## 2022-01-31 PROCEDURE — 90715 TDAP VACCINE 7 YRS/> IM: CPT | Performed by: FAMILY MEDICINE

## 2022-01-31 PROCEDURE — 80061 LIPID PANEL: CPT

## 2022-01-31 RX ORDER — BLOOD SUGAR DIAGNOSTIC
1 STRIP MISCELLANEOUS DAILY
Qty: 30 EACH | Refills: 11 | Status: CANCELLED | OUTPATIENT
Start: 2022-01-31

## 2022-01-31 RX ORDER — BUPROPION HYDROCHLORIDE 150 MG/1
150 TABLET ORAL DAILY
Qty: 30 TABLET | Refills: 5 | Status: SHIPPED | OUTPATIENT
Start: 2022-01-31 | End: 2022-08-13

## 2022-01-31 RX ORDER — DULOXETIN HYDROCHLORIDE 60 MG/1
CAPSULE, DELAYED RELEASE ORAL
COMMUNITY
Start: 2022-01-14

## 2022-01-31 RX ORDER — LIRAGLUTIDE 6 MG/ML
INJECTION, SOLUTION SUBCUTANEOUS
Qty: 1 PEN | Refills: 0 | Status: CANCELLED | OUTPATIENT
Start: 2022-01-31

## 2022-01-31 RX ORDER — ONDANSETRON 4 MG/1
TABLET, FILM COATED ORAL
COMMUNITY

## 2022-01-31 RX ORDER — POLYMYXIN B SULFATE AND TRIMETHOPRIM 1; 10000 MG/ML; [USP'U]/ML
SOLUTION OPHTHALMIC
COMMUNITY
Start: 2021-11-23

## 2022-01-31 RX ORDER — PROPRANOLOL HCL 60 MG
CAPSULE, EXTENDED RELEASE 24HR ORAL
COMMUNITY
Start: 2022-01-11

## 2022-01-31 NOTE — PATIENT INSTRUCTIONS
Nutrition  • Meet your nutrient needs primarily through nutrition by consuming foods and not just supplements  • The Mediterranean diet and DASH (Dietary Approaches to Stop Hypertension) diet are proven diets to become healthier and lowering the risk of high blood pressure, some kinds of cancer, stroke, heart disease, heart failure, kidney stones, and diabetes. They are also effective at weight loss.  • Macronutrient targets % total calories : Carbohydrates 50% (45-65%),  fat 30% (20-35%),  protein 20% (10-35%).   • Emphasize vegetables, fruits, whole grains, nuts and seeds, beans and legumes, olive oil, and drink water. Small amounts of dairy, fish and seafood, and lean meat such as poultry. Occasional beef, pork, lamb, sweets and processed foods such as baked goods, chips, crackers, chocolate and candy.   • Frozen vegetables and fruit are minimally processed , picked at its peak, convenient, and helps reduce food waste  • Try low-sodium canned tomatoes, beans, and vegetables. You can also rinse in water to help remove some sodium.   • Canned fruits packed in fruit juice is a better option than heavy syrup.   • Recommend whole fruits over juice. Dried fruits are ok but have a higher sugar content.   • Eat the rainbow. Vary your veggies with dark green, red and orange colors.   • Make half your grains whole grains. Oatmeal brown rice, quinoa, farro, whole-grain pasta, whole-grain bread, and whole-grain tortillas.   • Include a variety of protein sources such as lean meats, poultry, fish, seafood, beans, peas, nuts, seeds, eggs, soy   • Move to low-fat or fat-free milk or yogurt. Limit cheese.   • Other products sold as “milks” made from plants, such as rice, almond, coconut, and hemp “milks,” may contain calcium, but are not included in the dairy group because their overall nutrient content is not similar to dairy milk and fortified soy beverages   • Use oils like canola, olive, and others instead of solid fats  (like butter and stick margarine, shortening, lard, and coconut oil)  o Try grilling, broiling, roasting, or baking--they don't add extra fat  • Added sugars include syrups and other sweeteners (brown sugar, corn sweetener, corn syrup, dextrose, fructose, glucose, high fructose corn syrup, honey, invert sugar, lactose, malt syrup, maltose, molasses, raw sugar, sucrose, trehalose, and turbinado sugar). When sugars are added to foods to mary them, they add calories without contributing essential nutrients  • Cut calories by drinking water or unsweetened beverages. Soda, energy drinks, and sports drinks are a major source of added sugars  • Water intake of 1.5L - 2L (50-70 ounces) per day  • Daily fiber intake 20 g to 35 g per day  • Soluble fiber pulls in water to slow solidify and slow loose, watery stools plus lower cholesterol. Examples are oats, peas, beans, apples, citrus fruits, carrots, barley and psyllium   • Insoluble fiber is “roughage” to add bulk, make stool easier to pass and helps constipation. Examples are whole-wheat flour, wheat bran, nuts, beans and vegetables, such as cauliflower, green beans and potatoes.   Exercise    Less pain, better mood, and lower risk of many diseases  Some physical activity is better than none. Try to sit less throughout the day  Fitness is free--No equipment needed to walk, run/jog, dance, hike, Behzad Chi  150 minutes (2 hours and 30 minutes) to 300 minutes (5 hours) a week of moderate-intensity aerobic physical activity, or 75 minutes (1 hour and 15 minutes) to 150 minutes (2 hours and 30 minutes) a week of vigorous-intensity aerobic physical activity has substantial health benefits  Muscle strengthening exercises 2 days per week  Older adults should incorporate balance training   Bones need pressure to get stronger. Weight bearing exercises include running/jogging, dancing, hiking, elliptical, treadmill, stair climbing, jumping rope, aerobics.   Joint friendly low impact  activities include walking, biking, swimming, yoga, Behzad Chi, dance. Include range of motion and stretching exercises to improve flexibility.   https://health.gov/moveyourway

## 2022-01-31 NOTE — PROGRESS NOTES
"Chief Complaint  Annual Exam (Pt states she would like to get TDAP. ), Gynecologic Exam, and Weight Loss (Pt states she would like to try Ozmepic)    Subjective     {CC  Problem List  Visit Diagnosis   Encounters  Notes  Medications  Labs  Result Review Imaging  Media :23}     Melody Sher presents to [facility] for   History of Present Illness      Saxenda not approved by insurance. She eats lunch, salad or soup. Dinner soup lately. Cut back on carbs. One Cleo-8 soda per day and drinks water. 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 She doesn't like much meat, she eats tuna and fish.         PHQ-2/PHQ-9 Depression Screening 1/31/2022   Little interest or pleasure in doing things 0   Feeling down, depressed, or hopeless 0   Total Score 0       Objective   Vital Signs:   Vitals:    01/31/22 0953   BP: 128/72   Pulse: 94   SpO2: 100%   Weight: 129 kg (283 lb 6.4 oz)   Height: 166.4 cm (65.51\")   PainSc: 0-No pain     Body mass index is 46.43 kg/m².      Physical Exam  Vitals reviewed. Exam conducted with a chaperone present.   Constitutional:       General: She is not in acute distress.     Appearance: She is obese. She is not ill-appearing.   HENT:      Right Ear: Tympanic membrane and ear canal normal.      Left Ear: Tympanic membrane and ear canal normal.   Eyes:      General:         Right eye: No discharge.         Left eye: No discharge.      Conjunctiva/sclera: Conjunctivae normal.   Neck:      Thyroid: No thyromegaly.   Cardiovascular:      Rate and Rhythm: Normal rate and regular rhythm.   Pulmonary:      Effort: Pulmonary effort is normal. No respiratory distress.      Breath sounds: Normal breath sounds.   Chest:   Breasts:      Right: Normal. No mass, nipple discharge, skin change, tenderness, axillary adenopathy or supraclavicular adenopathy.      Left: Normal. No mass, nipple discharge, skin change, tenderness, axillary adenopathy or supraclavicular adenopathy. "       Abdominal:      Palpations: Abdomen is soft.      Tenderness: There is no abdominal tenderness.   Genitourinary:     General: Normal vulva.      Exam position: Lithotomy position.      Pubic Area: No rash.       Labia:         Right: No lesion.         Left: No lesion.       Urethra: No urethral lesion.      Vagina: Normal.      Cervix: Normal.      Uterus: Normal.       Adnexa:         Right: No mass or tenderness.          Left: No mass or tenderness.        Rectum: No external hemorrhoid.      Comments: Normal external genitalia    Musculoskeletal:      Cervical back: Neck supple.      Right lower leg: No edema.      Left lower leg: No edema.   Lymphadenopathy:      Head:      Right side of head: No submandibular, preauricular or posterior auricular adenopathy.      Left side of head: No submandibular, preauricular or posterior auricular adenopathy.      Cervical: No cervical adenopathy.      Right cervical: No superficial cervical adenopathy.     Left cervical: No superficial cervical adenopathy.      Upper Body:      Right upper body: No supraclavicular or axillary adenopathy.      Left upper body: No supraclavicular or axillary adenopathy.   Skin:     General: Skin is warm and dry.      Findings: No rash.   Neurological:      Mental Status: She is alert and oriented to person, place, and time.      Gait: Gait normal.   Psychiatric:         Mood and Affect: Mood normal.         Behavior: Behavior normal.         Thought Content: Thought content normal.         Judgment: Judgment normal.        Result Review :                Immunization History   Administered Date(s) Administered   • COVID-19 (PFIZER) PURPLE CAP 01/14/2021, 12/03/2021   • Flu Vaccine Quad PF 6-35MO 12/03/2021   • Tdap 01/31/2022       Health Maintenance   Topic Date Due   • LIPID PANEL  08/05/2021   • MAMMOGRAM  Never done   • PAP SMEAR  09/25/2022   • TDAP/TD VACCINES (2 - Td or Tdap) 01/31/2032   • INFLUENZA VACCINE  Completed             Assessment and Plan    Diagnoses and all orders for this visit:    1. Well adult exam (Primary)  -     CBC (No Diff); Future  -     Comprehensive Metabolic Panel; Future  -     TSH Rfx On Abnormal To Free T4; Future  -     Lipid Panel; Future    2. Screening for cervical cancer  -     IGP,rfx Aptima HPV All Pth; Future    3. Visit for screening mammogram  -     Mammo Screening Digital Tomosynthesis Bilateral With CAD; Future    4. Need for vaccination  -     Tdap Vaccine Greater Than or Equal To 6yo IM    5. Morbid obesity (HCC)  -     buPROPion XL (WELLBUTRIN XL) 150 MG 24 hr tablet; Take 1 tablet by mouth Daily.  Dispense: 30 tablet; Refill: 5  Patient's (Body mass index is 46.43 kg/m².) indicates that they are morbidly obese (BMI > 40 or > 35 with obesity - related health condition) with health related conditions that include none . Weight is unchanged. BMI is is above average; BMI management plan is completed. We discussed pharmacologic options including bupropion, GLP-1a, an suleiman-based approach such as Anatole Pal or Lose It and nutrition handout, exercise handout, increase protein and produce in diet.   Saxenda, Victoza, Ozempic not covered with insurance.  She has had  Adverse  side effects with phentermine and Topamax in the past.  At this time start trial of bupropion.  Reassess in 6 to 8 weeks.  If unsuccessful then will try prior authorization for Saxenda.    Counseled on health maintenance topics and preventative care recommendations: Tetanus vaccine, cervical cancer screening, breast cancer screening, colon cancer screening      Follow Up   Return in about 2 months (around 3/31/2022) for Office visit obesity and , Physical and fasting labs 1 year.  Patient was given instructions and counseling regarding her condition or for health maintenance advice. Please see specific information pulled into the AVS if appropriate.      Electronically signed by Adeola Nelson MD, 01/31/22, 10:16 AM  EST.

## 2022-02-01 DIAGNOSIS — R73.01 ELEVATED FASTING GLUCOSE: Primary | ICD-10-CM

## 2022-02-01 LAB — HBA1C MFR BLD: 5.4 % (ref 4.8–5.6)

## 2022-02-02 ENCOUNTER — PATIENT MESSAGE (OUTPATIENT)
Dept: FAMILY MEDICINE CLINIC | Facility: CLINIC | Age: 42
End: 2022-02-02

## 2022-07-08 ENCOUNTER — APPOINTMENT (OUTPATIENT)
Dept: OTHER | Facility: HOSPITAL | Age: 42
End: 2022-07-08

## 2022-07-08 ENCOUNTER — HOSPITAL ENCOUNTER (OUTPATIENT)
Dept: MAMMOGRAPHY | Facility: HOSPITAL | Age: 42
Discharge: HOME | End: 2022-07-08
Admitting: FAMILY MEDICINE

## 2022-07-08 DIAGNOSIS — Z92.89 HISTORY OF MAMMOGRAM: ICD-10-CM

## 2022-07-08 DIAGNOSIS — Z12.31 VISIT FOR SCREENING MAMMOGRAM: ICD-10-CM

## 2022-07-08 PROCEDURE — 77067 SCR MAMMO BI INCL CAD: CPT | Performed by: RADIOLOGY

## 2022-07-08 PROCEDURE — 77063 BREAST TOMOSYNTHESIS BI: CPT

## 2022-07-08 PROCEDURE — 77067 SCR MAMMO BI INCL CAD: CPT

## 2022-07-08 PROCEDURE — 77063 BREAST TOMOSYNTHESIS BI: CPT | Performed by: RADIOLOGY

## 2022-08-13 DIAGNOSIS — E66.01 MORBID OBESITY: ICD-10-CM

## 2022-08-13 RX ORDER — BUPROPION HYDROCHLORIDE 150 MG/1
TABLET ORAL
Qty: 90 TABLET | Refills: 1 | Status: SHIPPED | OUTPATIENT
Start: 2022-08-13

## 2022-08-13 NOTE — TELEPHONE ENCOUNTER
Rx Refill Note  Requested Prescriptions     Pending Prescriptions Disp Refills   • buPROPion XL (WELLBUTRIN XL) 150 MG 24 hr tablet [Pharmacy Med Name: buPROPion HCL  MG TABLET] 90 tablet      Sig: TAKE ONE TABLET BY MOUTH DAILY      Last office visit with prescribing clinician: 1/31/2022      Next office visit with prescribing clinician: Visit date not found   Nataly Barnard MA  08/13/22, 09:34 EDT     Last fill: 01/31/2022

## 2024-02-01 ENCOUNTER — OFFICE VISIT (OUTPATIENT)
Age: 44
End: 2024-02-01
Payer: COMMERCIAL

## 2024-02-01 ENCOUNTER — PRIOR AUTHORIZATION (OUTPATIENT)
Age: 44
End: 2024-02-01
Payer: COMMERCIAL

## 2024-02-01 ENCOUNTER — LAB (OUTPATIENT)
Age: 44
End: 2024-02-01
Payer: COMMERCIAL

## 2024-02-01 VITALS
HEART RATE: 83 BPM | HEIGHT: 66 IN | BODY MASS INDEX: 44.9 KG/M2 | OXYGEN SATURATION: 97 % | WEIGHT: 279.4 LBS | SYSTOLIC BLOOD PRESSURE: 124 MMHG | DIASTOLIC BLOOD PRESSURE: 78 MMHG

## 2024-02-01 DIAGNOSIS — E78.2 MIXED HYPERLIPIDEMIA: Primary | ICD-10-CM

## 2024-02-01 DIAGNOSIS — E66.01 CLASS 3 SEVERE OBESITY DUE TO EXCESS CALORIES WITH SERIOUS COMORBIDITY AND BODY MASS INDEX (BMI) OF 45.0 TO 49.9 IN ADULT: ICD-10-CM

## 2024-02-01 DIAGNOSIS — R73.01 ELEVATED FASTING GLUCOSE: ICD-10-CM

## 2024-02-01 DIAGNOSIS — Z12.31 VISIT FOR SCREENING MAMMOGRAM: ICD-10-CM

## 2024-02-01 DIAGNOSIS — E11.00 TYPE II DIABETES MELLITUS WITH HYPEROSMOLARITY, UNCONTROLLED: ICD-10-CM

## 2024-02-01 DIAGNOSIS — E03.9 MYXEDEMA HEART DISEASE: ICD-10-CM

## 2024-02-01 DIAGNOSIS — N92.6 IRREGULAR PERIODS: ICD-10-CM

## 2024-02-01 DIAGNOSIS — I10 ESSENTIAL HYPERTENSION, MALIGNANT: ICD-10-CM

## 2024-02-01 DIAGNOSIS — Z12.11 SCREENING FOR MALIGNANT NEOPLASM OF COLON: ICD-10-CM

## 2024-02-01 DIAGNOSIS — I51.9 MYXEDEMA HEART DISEASE: ICD-10-CM

## 2024-02-01 DIAGNOSIS — Z00.00 WELL ADULT EXAM: Primary | ICD-10-CM

## 2024-02-01 DIAGNOSIS — Z00.00 WELL ADULT EXAM: ICD-10-CM

## 2024-02-01 DIAGNOSIS — E11.65 TYPE II DIABETES MELLITUS WITH HYPEROSMOLARITY, UNCONTROLLED: ICD-10-CM

## 2024-02-01 LAB
ALBUMIN SERPL-MCNC: 4.2 G/DL (ref 3.5–5.2)
ALBUMIN/GLOB SERPL: 1.4 G/DL
ALP SERPL-CCNC: 71 U/L (ref 39–117)
ALT SERPL W P-5'-P-CCNC: 11 U/L (ref 1–33)
ANION GAP SERPL CALCULATED.3IONS-SCNC: 11.7 MMOL/L (ref 5–15)
AST SERPL-CCNC: 18 U/L (ref 1–32)
BILIRUB SERPL-MCNC: 0.2 MG/DL (ref 0–1.2)
BUN SERPL-MCNC: 15 MG/DL (ref 6–20)
BUN/CREAT SERPL: 16.7 (ref 7–25)
CALCIUM SPEC-SCNC: 9.1 MG/DL (ref 8.6–10.5)
CHLORIDE SERPL-SCNC: 104 MMOL/L (ref 98–107)
CHOLEST SERPL-MCNC: 185 MG/DL (ref 0–200)
CO2 SERPL-SCNC: 23.3 MMOL/L (ref 22–29)
CREAT SERPL-MCNC: 0.9 MG/DL (ref 0.57–1)
DEPRECATED RDW RBC AUTO: 41.2 FL (ref 37–54)
EGFRCR SERPLBLD CKD-EPI 2021: 80.5 ML/MIN/1.73
ERYTHROCYTE [DISTWIDTH] IN BLOOD BY AUTOMATED COUNT: 12.9 % (ref 12.3–15.4)
FSH SERPL-ACNC: 11 MIU/ML
GLOBULIN UR ELPH-MCNC: 3 GM/DL
GLUCOSE SERPL-MCNC: 98 MG/DL (ref 65–99)
HBA1C MFR BLD: 5.7 % (ref 4.8–5.6)
HCT VFR BLD AUTO: 43.5 % (ref 34–46.6)
HDLC SERPL-MCNC: 61 MG/DL (ref 40–60)
HGB BLD-MCNC: 14.3 G/DL (ref 12–15.9)
LDLC SERPL CALC-MCNC: 104 MG/DL (ref 0–100)
LDLC/HDLC SERPL: 1.66 {RATIO}
LH SERPL-ACNC: 7.17 MIU/ML
MCH RBC QN AUTO: 29 PG (ref 26.6–33)
MCHC RBC AUTO-ENTMCNC: 32.9 G/DL (ref 31.5–35.7)
MCV RBC AUTO: 88.2 FL (ref 79–97)
PLATELET # BLD AUTO: 344 10*3/MM3 (ref 140–450)
PMV BLD AUTO: 10.2 FL (ref 6–12)
POTASSIUM SERPL-SCNC: 4.2 MMOL/L (ref 3.5–5.2)
PROLACTIN SERPL-MCNC: 10.5 NG/ML (ref 4.79–23.3)
PROT SERPL-MCNC: 7.2 G/DL (ref 6–8.5)
RBC # BLD AUTO: 4.93 10*6/MM3 (ref 3.77–5.28)
SODIUM SERPL-SCNC: 139 MMOL/L (ref 136–145)
TRIGL SERPL-MCNC: 114 MG/DL (ref 0–150)
TSH SERPL DL<=0.05 MIU/L-ACNC: 0.89 UIU/ML (ref 0.27–4.2)
VLDLC SERPL-MCNC: 20 MG/DL (ref 5–40)
WBC NRBC COR # BLD AUTO: 8.93 10*3/MM3 (ref 3.4–10.8)

## 2024-02-01 PROCEDURE — 80050 GENERAL HEALTH PANEL: CPT | Performed by: FAMILY MEDICINE

## 2024-02-01 PROCEDURE — 83036 HEMOGLOBIN GLYCOSYLATED A1C: CPT | Performed by: FAMILY MEDICINE

## 2024-02-01 PROCEDURE — 80061 LIPID PANEL: CPT | Performed by: FAMILY MEDICINE

## 2024-02-01 PROCEDURE — 83001 ASSAY OF GONADOTROPIN (FSH): CPT | Performed by: FAMILY MEDICINE

## 2024-02-01 PROCEDURE — 83002 ASSAY OF GONADOTROPIN (LH): CPT | Performed by: FAMILY MEDICINE

## 2024-02-01 PROCEDURE — 84146 ASSAY OF PROLACTIN: CPT | Performed by: FAMILY MEDICINE

## 2024-02-01 RX ORDER — SEMAGLUTIDE 0.25 MG/.5ML
0.25 INJECTION, SOLUTION SUBCUTANEOUS WEEKLY
Qty: 0.5 ML | Refills: 0 | Status: SHIPPED | OUTPATIENT
Start: 2024-02-01

## 2024-02-01 NOTE — PROGRESS NOTES
"Chief Complaint  Well adult exam and Annual Exam (Need blood work, pap, and bump on back . And talk about an ablation of having my period at the end of December )    Subjective          Melody Aubree Sher presents to [facility] for   History of Present Illness  Answers submitted by the patient for this visit:  Other (Submitted on 2/1/2024)  Please describe your symptoms.: Would like to discuss having an ablation been bleeding on and off for months every two weeks I’m having a period less than 6 to 8 days. And would like to have my yearly labs since my mom and grandmother have diabetes, lupus, etc..  Have you had these symptoms before?: Yes  How long have you been having these symptoms?: Greater than 2 weeks  Please list any medications you are currently taking for this condition.: 60 mg of Cymbalta and 60 mg of propanolol  Primary Reason for Visit (Submitted on 2/1/2024)  What is the primary reason for your visit?: Other    She no longer has skin tag on her thigh.    Yesterday she had salad and baked spaghetti. Drinks water all day with 40 oz cup 3-4 times. She works at home and walks up and down steps often going to the bathroom. She takes 2 15 min breaks to walk around. Not exercising. She used to take Saxenda.     Bump on left side back for 2 months has gone down. In November she had itching like a spider bite. Hard.     Last couple months periods every 2 weeks. LMP 1/26/24. Gets migraines on menses.           Objective   Vital Signs:   Vitals:    02/01/24 0945   BP: 124/78   Pulse: 83   SpO2: 97%   Weight: 127 kg (279 lb 6.4 oz)   Height: 166.4 cm (65.51\")   PainSc: 0-No pain     Body mass index is 45.77 kg/m².              Physical Exam  Constitutional:       General: She is not in acute distress.     Appearance: She is morbidly obese.   HENT:      Right Ear: Tympanic membrane and ear canal normal.      Left Ear: Tympanic membrane and ear canal normal.      Nose: No congestion or " rhinorrhea.      Mouth/Throat:      Mouth: Mucous membranes are moist.      Pharynx: No oropharyngeal exudate or posterior oropharyngeal erythema.   Neck:      Thyroid: No thyromegaly.   Cardiovascular:      Rate and Rhythm: Normal rate and regular rhythm.   Pulmonary:      Effort: Pulmonary effort is normal.      Breath sounds: Normal breath sounds.   Abdominal:      Palpations: Abdomen is soft. There is no hepatomegaly.      Tenderness: There is no abdominal tenderness.   Musculoskeletal:      Cervical back: Neck supple.   Lymphadenopathy:      Head:      Right side of head: No submandibular, preauricular or posterior auricular adenopathy.      Left side of head: No submandibular, preauricular or posterior auricular adenopathy.      Cervical: No cervical adenopathy.   Skin:     General: Skin is warm.      Findings: No lesion or rash.   Neurological:      Mental Status: She is alert and oriented to person, place, and time.   Psychiatric:         Mood and Affect: Mood normal.         Behavior: Behavior normal.         Thought Content: Thought content normal.         Judgment: Judgment normal.        Result Review :                Immunization History   Administered Date(s) Administered    COVID-19 (PFIZER) Purple Cap Monovalent 01/14/2021, 12/03/2021    Flu Vaccine Quad PF 6-35MO 12/03/2021    Tdap 01/31/2022       Health Maintenance   Topic Date Due    COLORECTAL CANCER SCREENING  Never done    ANNUAL PHYSICAL  01/31/2023    LIPID PANEL  01/31/2023    BMI FOLLOWUP  01/31/2023    MAMMOGRAM  07/08/2023    COVID-19 Vaccine (3 - 2023-24 season) 09/01/2023    INFLUENZA VACCINE  03/31/2024 (Originally 8/1/2023)    PAP SMEAR  02/01/2027    TDAP/TD VACCINES (2 - Td or Tdap) 01/31/2032    HEPATITIS C SCREENING  Completed    Pneumococcal Vaccine 0-64  Aged Out            Assessment and Plan    Diagnoses and all orders for this visit:    1. Well adult exam (Primary)  -     CBC (No Diff); Future  -     Comprehensive Metabolic  Panel; Future  -     Lipid Panel; Future  -     TSH Rfx On Abnormal To Free T4; Future  -     Hemoglobin A1c; Future  Check fasting labs today.  Cervical cancer screening Pap smear up-to-date.  She declined influenza vaccine  2. Elevated fasting glucose  -     Comprehensive Metabolic Panel; Future  -     Hemoglobin A1c; Future    3. Class 3 severe obesity due to excess calories with serious comorbidity and body mass index (BMI) of 45.0 to 49.9 in adult  -     Semaglutide-Weight Management (Wegovy) 0.25 MG/0.5ML solution auto-injector; Inject 0.5 mL under the skin into the appropriate area as directed 1 (One) Time Per Week.  Dispense: 0.5 mL; Refill: 0  Class 3 Severe Obesity (BMI >=40). Obesity-related health conditions include the following:  elevated fasting glucose . Obesity is unchanged. BMI is is above average; BMI management plan is completed. We discussed increasing exercise and pharmacologic options including Wegovy .  If Wegovy approved by insurance and available at the pharmacy schedule follow-up visit at 1 to 2 months.  Discussed titration protocol.  Advised on mechanism of action and side effects to monitor for.  She had previously used Saxenda and was on bupropion.  4. Visit for screening mammogram  -     Mammo Screening Digital Tomosynthesis Bilateral With CAD; Future    5. Screening for malignant neoplasm of colon  -     Cologuard - Stool, Per Rectum; Future    6. Irregular periods  -     FSH & LH; Future  -     Prolactin; Future  -     Estrogens, Total; Future  -     Ambulatory Referral to Obstetrics / Gynecology  -     US Non-ob Transvaginal; Future        Counseling/anticipatory guidance: Nutrition, physical activity, dental health,  immunizations, screenings      Follow Up   Return in about 1 year (around 2/2/2025) for Physical with Pap, fasting labs.  Patient was given instructions and counseling regarding her condition or for health maintenance advice. Please see specific information pulled into  the AVS if appropriate.      Electronically signed by Adeola Cortez MD, 02/01/24, 10:22 AM EST.

## 2024-02-01 NOTE — TELEPHONE ENCOUNTER
Key: [id number]    Drug:  Wegovy 0.25MG/0.5ML auto-injectors    Sent to plan-Awaiting response  2/1/24

## 2024-02-02 PROBLEM — R73.03 PREDIABETES: Status: ACTIVE | Noted: 2024-02-01

## 2024-02-02 NOTE — TELEPHONE ENCOUNTER
Please inform patient that she would need to be in a comprehensive weight loss program for 3 months prior to the insurance covering this medication.  I recommend that she check with her insurance to see if they offer such a program.

## 2024-02-02 NOTE — TELEPHONE ENCOUNTER
PA denied    Message from plan: the use of this medication without patient having participated in a comprehensive weight  management program that encourages behavioral modification, reduced calorie diet, and  increased physical activity with continuing follow-up for at least 3 months prior to using this  medication does not establish medical necessity for this drug

## 2024-02-06 LAB — ESTROGEN SERPL-MCNC: 136 PG/ML

## 2024-03-13 ENCOUNTER — HOSPITAL ENCOUNTER (OUTPATIENT)
Dept: ULTRASOUND IMAGING | Facility: HOSPITAL | Age: 44
Discharge: HOME | End: 2024-03-13
Admitting: FAMILY MEDICINE
Payer: COMMERCIAL

## 2024-03-13 DIAGNOSIS — N92.6 IRREGULAR PERIODS: ICD-10-CM

## 2024-03-13 PROCEDURE — 76830 TRANSVAGINAL US NON-OB: CPT

## 2025-03-26 ENCOUNTER — TELEPHONE (OUTPATIENT)
Age: 45
End: 2025-03-26
Payer: COMMERCIAL

## 2025-03-26 NOTE — TELEPHONE ENCOUNTER
WE RECEIVED DISABILITY FORMS FROM [employer] FOR PATIENT. THEY REQUESTED NOTIFICATION IF THERE IS AN ASSOCIATED FEE.     CALLED AND LVM FOR OLIMPIA REGARDING $25 FORM FEE. WAITING ON CALL BACK.

## 2025-04-07 ENCOUNTER — TELEPHONE (OUTPATIENT)
Age: 45
End: 2025-04-07
Payer: COMMERCIAL

## 2025-04-07 NOTE — TELEPHONE ENCOUNTER
Caller: OLIMPIA DURHAM    Relationship to Patient: Other    Phone Number: [phone]     Reason for Call: OLIMPIA WAS CHECKING TO SEE IF [facility] HAS RECEIVED THE RELEASE OF INFORMATION PAPERWORK SHE SENT OVER

## 2025-04-08 NOTE — TELEPHONE ENCOUNTER
There is a fax for release of information on 3/25/2026.  Please follow-up on sending medical records.